# Patient Record
Sex: FEMALE | Race: WHITE | NOT HISPANIC OR LATINO
[De-identification: names, ages, dates, MRNs, and addresses within clinical notes are randomized per-mention and may not be internally consistent; named-entity substitution may affect disease eponyms.]

---

## 2017-03-06 ENCOUNTER — RX RENEWAL (OUTPATIENT)
Age: 62
End: 2017-03-06

## 2018-08-07 ENCOUNTER — OUTPATIENT (OUTPATIENT)
Dept: OUTPATIENT SERVICES | Facility: HOSPITAL | Age: 63
LOS: 1 days | End: 2018-08-07
Payer: COMMERCIAL

## 2018-08-07 ENCOUNTER — APPOINTMENT (OUTPATIENT)
Dept: MAMMOGRAPHY | Facility: HOSPITAL | Age: 63
End: 2018-08-07
Payer: COMMERCIAL

## 2018-08-07 ENCOUNTER — APPOINTMENT (OUTPATIENT)
Dept: ULTRASOUND IMAGING | Facility: HOSPITAL | Age: 63
End: 2018-08-07
Payer: COMMERCIAL

## 2018-08-07 DIAGNOSIS — Z00.8 ENCOUNTER FOR OTHER GENERAL EXAMINATION: ICD-10-CM

## 2018-08-07 PROCEDURE — 77067 SCR MAMMO BI INCL CAD: CPT | Mod: 26

## 2018-08-07 PROCEDURE — 76641 ULTRASOUND BREAST COMPLETE: CPT

## 2018-08-07 PROCEDURE — 76641 ULTRASOUND BREAST COMPLETE: CPT | Mod: 26

## 2018-08-07 PROCEDURE — 77063 BREAST TOMOSYNTHESIS BI: CPT | Mod: 26

## 2018-08-07 PROCEDURE — 77067 SCR MAMMO BI INCL CAD: CPT

## 2018-08-07 PROCEDURE — 77063 BREAST TOMOSYNTHESIS BI: CPT

## 2018-08-09 ENCOUNTER — RESULT REVIEW (OUTPATIENT)
Age: 63
End: 2018-08-09

## 2018-08-13 ENCOUNTER — APPOINTMENT (OUTPATIENT)
Dept: ULTRASOUND IMAGING | Facility: CLINIC | Age: 63
End: 2018-08-13
Payer: COMMERCIAL

## 2018-08-13 ENCOUNTER — OUTPATIENT (OUTPATIENT)
Dept: OUTPATIENT SERVICES | Facility: HOSPITAL | Age: 63
LOS: 1 days | End: 2018-08-13
Payer: COMMERCIAL

## 2018-08-13 ENCOUNTER — RESULT REVIEW (OUTPATIENT)
Age: 63
End: 2018-08-13

## 2018-08-13 DIAGNOSIS — Z00.8 ENCOUNTER FOR OTHER GENERAL EXAMINATION: ICD-10-CM

## 2018-08-13 PROCEDURE — 19083 BX BREAST 1ST LESION US IMAG: CPT | Mod: LT

## 2018-08-13 PROCEDURE — 77065 DX MAMMO INCL CAD UNI: CPT

## 2018-08-13 PROCEDURE — 19083 BX BREAST 1ST LESION US IMAG: CPT

## 2018-08-13 PROCEDURE — A4648: CPT

## 2018-08-13 PROCEDURE — 77065 DX MAMMO INCL CAD UNI: CPT | Mod: 26,LT

## 2019-01-25 ENCOUNTER — TRANSCRIPTION ENCOUNTER (OUTPATIENT)
Age: 64
End: 2019-01-25

## 2019-03-06 ENCOUNTER — APPOINTMENT (OUTPATIENT)
Dept: INTERNAL MEDICINE | Facility: CLINIC | Age: 64
End: 2019-03-06
Payer: COMMERCIAL

## 2019-03-06 VITALS
DIASTOLIC BLOOD PRESSURE: 78 MMHG | TEMPERATURE: 98.1 F | SYSTOLIC BLOOD PRESSURE: 128 MMHG | HEIGHT: 64 IN | HEART RATE: 95 BPM | BODY MASS INDEX: 18.78 KG/M2 | RESPIRATION RATE: 16 BRPM | WEIGHT: 110 LBS | OXYGEN SATURATION: 98 %

## 2019-03-06 DIAGNOSIS — F51.04 PSYCHOPHYSIOLOGIC INSOMNIA: ICD-10-CM

## 2019-03-06 PROCEDURE — 99204 OFFICE O/P NEW MOD 45 MIN: CPT

## 2019-03-06 RX ORDER — MULTIVITAMIN/IRON/FOLIC ACID 18MG-0.4MG
TABLET ORAL
Refills: 0 | Status: ACTIVE | COMMUNITY
Start: 2019-03-06

## 2019-03-06 NOTE — HEALTH RISK ASSESSMENT
[Good] : ~his/her~  mood as  good [Intercurrent Urgi Care visits] : went to urgent care [Patient reported mammogram was abnormal] : Patient reported mammogram was abnormal [Patient reported PAP Smear was normal] : Patient reported PAP Smear was normal [Patient reported bone density results were normal] : Patient reported bone density results were normal [Patient reported colonoscopy was normal] : Patient reported colonoscopy was normal [HIV test declined] : HIV test declined [Hepatitis C test declined] : Hepatitis C test declined [None] : None [FreeTextEntry1] : had pneumonia [] : No [de-identified] : pneumonia [de-identified] : walks regularly 2-3 miles  [de-identified] : fair [Change in mental status noted] : No change in mental status noted [Language] : denies difficulty with language [Behavior] : denies difficulty with behavior [Learning/Retaining New Information] : denies difficulty learning/retaining new information [Handling Complex Tasks] : denies difficulty handling complex tasks [Reasoning] : denies difficulty with reasoning [Spatial Ability and Orientation] : denies difficulty with spatial ability and orientation [MammogramDate] : 08/2018 [MammogramComments] : biopsy, no cancer [PapSmearDate] : 8/2018 [BoneDensityDate] : 2017 [BoneDensityComments] : osteopenia [ColonoscopyDate] : 2012

## 2019-03-06 NOTE — HISTORY OF PRESENT ILLNESS
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\par Dtr Phoebe Parker  @ [employer]\par \par Ade Sandhu

## 2019-03-07 ENCOUNTER — TRANSCRIPTION ENCOUNTER (OUTPATIENT)
Age: 64
End: 2019-03-07

## 2019-03-07 LAB
ALBUMIN SERPL ELPH-MCNC: 4.6 G/DL
ALP BLD-CCNC: 78 U/L
ALT SERPL-CCNC: 21 U/L
ANION GAP SERPL CALC-SCNC: 13 MMOL/L
AST SERPL-CCNC: 23 U/L
BASOPHILS # BLD AUTO: 0.08 K/UL
BASOPHILS NFR BLD AUTO: 1.6 %
BILIRUB SERPL-MCNC: 0.4 MG/DL
BUN SERPL-MCNC: 20 MG/DL
CALCIUM SERPL-MCNC: 9.6 MG/DL
CHLORIDE SERPL-SCNC: 102 MMOL/L
CHOLEST SERPL-MCNC: 182 MG/DL
CHOLEST/HDLC SERPL: 2.1 RATIO
CO2 SERPL-SCNC: 24 MMOL/L
CREAT SERPL-MCNC: 0.72 MG/DL
EOSINOPHIL # BLD AUTO: 0.11 K/UL
EOSINOPHIL NFR BLD AUTO: 2.2 %
GLUCOSE SERPL-MCNC: 105 MG/DL
HBA1C MFR BLD HPLC: 5.9 %
HCT VFR BLD CALC: 40 %
HDLC SERPL-MCNC: 85 MG/DL
HGB BLD-MCNC: 12.9 G/DL
IMM GRANULOCYTES NFR BLD AUTO: 0.4 %
LDLC SERPL CALC-MCNC: 87 MG/DL
LYMPHOCYTES # BLD AUTO: 1.67 K/UL
LYMPHOCYTES NFR BLD AUTO: 33.7 %
MAN DIFF?: NORMAL
MCHC RBC-ENTMCNC: 28.5 PG
MCHC RBC-ENTMCNC: 32.3 GM/DL
MCV RBC AUTO: 88.5 FL
MONOCYTES # BLD AUTO: 0.47 K/UL
MONOCYTES NFR BLD AUTO: 9.5 %
NEUTROPHILS # BLD AUTO: 2.6 K/UL
NEUTROPHILS NFR BLD AUTO: 52.6 %
PLATELET # BLD AUTO: 273 K/UL
POTASSIUM SERPL-SCNC: 4.3 MMOL/L
PROT SERPL-MCNC: 7 G/DL
RBC # BLD: 4.52 M/UL
RBC # FLD: 13.2 %
SODIUM SERPL-SCNC: 139 MMOL/L
TRIGL SERPL-MCNC: 49 MG/DL
TSH SERPL-ACNC: 1.64 UIU/ML
WBC # FLD AUTO: 4.95 K/UL

## 2019-03-08 LAB — 25(OH)D3 SERPL-MCNC: 13.2 NG/ML

## 2019-03-25 ENCOUNTER — TRANSCRIPTION ENCOUNTER (OUTPATIENT)
Age: 64
End: 2019-03-25

## 2019-04-10 ENCOUNTER — CLINICAL ADVICE (OUTPATIENT)
Age: 64
End: 2019-04-10

## 2019-10-06 ENCOUNTER — TRANSCRIPTION ENCOUNTER (OUTPATIENT)
Age: 64
End: 2019-10-06

## 2019-10-06 DIAGNOSIS — J30.9 ALLERGIC RHINITIS, UNSPECIFIED: ICD-10-CM

## 2019-10-28 ENCOUNTER — APPOINTMENT (OUTPATIENT)
Dept: INTERNAL MEDICINE | Facility: CLINIC | Age: 64
End: 2019-10-28
Payer: COMMERCIAL

## 2019-10-28 VITALS
TEMPERATURE: 98.1 F | BODY MASS INDEX: 18.78 KG/M2 | WEIGHT: 110 LBS | RESPIRATION RATE: 16 BRPM | HEIGHT: 64 IN | HEART RATE: 108 BPM | DIASTOLIC BLOOD PRESSURE: 80 MMHG | SYSTOLIC BLOOD PRESSURE: 130 MMHG | OXYGEN SATURATION: 98 %

## 2019-10-28 PROCEDURE — 99213 OFFICE O/P EST LOW 20 MIN: CPT

## 2019-10-28 NOTE — HISTORY OF PRESENT ILLNESS
[Moderate] : moderate [___ Days ago] : [unfilled] days ago [Episodic] : episodic  [Cough] : cough [Congestion] : no congestion [Sore Throat] : no sore throat [Anorexia] : no anorexia [Shortness Of Breath] : no shortness of breath [Headache] : no headache [Fever] : no fever [FreeTextEntry8] : was away at son's wedding and came back was run down \par -took robitussin no relief\par -cough is uncontrollable at times\par -took zpack and mucinex some relief\par \par

## 2019-11-14 ENCOUNTER — TRANSCRIPTION ENCOUNTER (OUTPATIENT)
Age: 64
End: 2019-11-14

## 2019-12-27 ENCOUNTER — OUTPATIENT (OUTPATIENT)
Dept: OUTPATIENT SERVICES | Facility: HOSPITAL | Age: 64
LOS: 1 days | End: 2019-12-27
Payer: COMMERCIAL

## 2019-12-27 DIAGNOSIS — K29.00 ACUTE GASTRITIS WITHOUT BLEEDING: ICD-10-CM

## 2019-12-27 DIAGNOSIS — R11.2 NAUSEA WITH VOMITING, UNSPECIFIED: ICD-10-CM

## 2019-12-27 PROCEDURE — 74220 X-RAY XM ESOPHAGUS 1CNTRST: CPT | Mod: 26

## 2019-12-27 PROCEDURE — 74220 X-RAY XM ESOPHAGUS 1CNTRST: CPT

## 2020-01-31 RX ORDER — AZITHROMYCIN 250 MG/1
250 TABLET, FILM COATED ORAL
Qty: 1 | Refills: 1 | Status: DISCONTINUED | COMMUNITY
Start: 2019-10-24 | End: 2020-01-31

## 2020-02-18 ENCOUNTER — OUTPATIENT (OUTPATIENT)
Dept: OUTPATIENT SERVICES | Facility: HOSPITAL | Age: 65
LOS: 1 days | End: 2020-02-18
Payer: COMMERCIAL

## 2020-02-18 DIAGNOSIS — R05 COUGH: ICD-10-CM

## 2020-02-18 LAB — GLUCOSE BLDC GLUCOMTR-MCNC: 125 MG/DL — HIGH (ref 70–99)

## 2020-02-18 PROCEDURE — A9541: CPT

## 2020-02-18 PROCEDURE — 78264 GASTRIC EMPTYING IMG STUDY: CPT

## 2020-02-18 PROCEDURE — 82962 GLUCOSE BLOOD TEST: CPT

## 2020-02-18 PROCEDURE — 78264 GASTRIC EMPTYING IMG STUDY: CPT | Mod: 26,GC

## 2020-02-19 ENCOUNTER — TRANSCRIPTION ENCOUNTER (OUTPATIENT)
Age: 65
End: 2020-02-19

## 2020-03-05 ENCOUNTER — OUTPATIENT (OUTPATIENT)
Dept: OUTPATIENT SERVICES | Facility: HOSPITAL | Age: 65
LOS: 1 days | End: 2020-03-05
Payer: COMMERCIAL

## 2020-03-05 DIAGNOSIS — R05 COUGH: ICD-10-CM

## 2020-03-05 PROCEDURE — 91010 ESOPHAGUS MOTILITY STUDY: CPT

## 2020-03-05 PROCEDURE — C1889: CPT

## 2020-04-16 ENCOUNTER — APPOINTMENT (OUTPATIENT)
Dept: GASTROENTEROLOGY | Facility: CLINIC | Age: 65
End: 2020-04-16

## 2020-05-11 ENCOUNTER — APPOINTMENT (OUTPATIENT)
Dept: INTERNAL MEDICINE | Facility: CLINIC | Age: 65
End: 2020-05-11
Payer: COMMERCIAL

## 2020-05-11 DIAGNOSIS — K44.9 DIAPHRAGMATIC HERNIA W/OUT OBSTRUCTION OR GANGRENE: ICD-10-CM

## 2020-05-11 DIAGNOSIS — R68.89 OTHER GENERAL SYMPTOMS AND SIGNS: ICD-10-CM

## 2020-05-11 DIAGNOSIS — K21.9 GASTRO-ESOPHAGEAL REFLUX DISEASE W/OUT ESOPHAGITIS: ICD-10-CM

## 2020-05-11 PROCEDURE — 99443: CPT

## 2020-05-12 ENCOUNTER — LABORATORY RESULT (OUTPATIENT)
Age: 65
End: 2020-05-12

## 2020-08-03 ENCOUNTER — APPOINTMENT (OUTPATIENT)
Dept: INTERNAL MEDICINE | Facility: CLINIC | Age: 65
End: 2020-08-03
Payer: COMMERCIAL

## 2020-08-03 PROCEDURE — 99441: CPT

## 2020-08-06 LAB — SARS-COV-2 N GENE NPH QL NAA+PROBE: NOT DETECTED

## 2020-08-13 ENCOUNTER — APPOINTMENT (OUTPATIENT)
Dept: INTERNAL MEDICINE | Facility: CLINIC | Age: 65
End: 2020-08-13
Payer: COMMERCIAL

## 2020-08-13 ENCOUNTER — OUTPATIENT (OUTPATIENT)
Dept: OUTPATIENT SERVICES | Facility: HOSPITAL | Age: 65
LOS: 1 days | End: 2020-08-13
Payer: COMMERCIAL

## 2020-08-13 ENCOUNTER — APPOINTMENT (OUTPATIENT)
Dept: RADIOLOGY | Facility: HOSPITAL | Age: 65
End: 2020-08-13
Payer: COMMERCIAL

## 2020-08-13 VITALS
HEIGHT: 64 IN | SYSTOLIC BLOOD PRESSURE: 130 MMHG | TEMPERATURE: 97.6 F | DIASTOLIC BLOOD PRESSURE: 80 MMHG | HEART RATE: 110 BPM | OXYGEN SATURATION: 98 % | RESPIRATION RATE: 17 BRPM | BODY MASS INDEX: 18.78 KG/M2 | WEIGHT: 110 LBS

## 2020-08-13 DIAGNOSIS — Z00.8 ENCOUNTER FOR OTHER GENERAL EXAMINATION: ICD-10-CM

## 2020-08-13 DIAGNOSIS — Z87.09 PERSONAL HISTORY OF OTHER DISEASES OF THE RESPIRATORY SYSTEM: ICD-10-CM

## 2020-08-13 PROCEDURE — 99213 OFFICE O/P EST LOW 20 MIN: CPT

## 2020-08-13 PROCEDURE — 71046 X-RAY EXAM CHEST 2 VIEWS: CPT

## 2020-08-13 PROCEDURE — 71046 X-RAY EXAM CHEST 2 VIEWS: CPT | Mod: 26

## 2020-08-13 NOTE — PHYSICAL EXAM
[No Acute Distress] : no acute distress [Well Nourished] : well nourished [Well-Appearing] : well-appearing [Well Developed] : well developed [Normal Sclera/Conjunctiva] : normal sclera/conjunctiva [EOMI] : extraocular movements intact [PERRL] : pupils equal round and reactive to light [Normal Outer Ear/Nose] : the outer ears and nose were normal in appearance [Normal Oropharynx] : the oropharynx was normal [No JVD] : no jugular venous distention [No Lymphadenopathy] : no lymphadenopathy [Thyroid Normal, No Nodules] : the thyroid was normal and there were no nodules present [Supple] : supple [No Accessory Muscle Use] : no accessory muscle use [No Respiratory Distress] : no respiratory distress  [Clear to Auscultation] : lungs were clear to auscultation bilaterally [Normal Rate] : normal rate  [Regular Rhythm] : with a regular rhythm [Normal S1, S2] : normal S1 and S2 [No Murmur] : no murmur heard [No Carotid Bruits] : no carotid bruits [No Abdominal Bruit] : a ~M bruit was not heard ~T in the abdomen [No Varicosities] : no varicosities [Pedal Pulses Present] : the pedal pulses are present [No Edema] : there was no peripheral edema [No Palpable Aorta] : no palpable aorta [No Extremity Clubbing/Cyanosis] : no extremity clubbing/cyanosis [Soft] : abdomen soft [Non Tender] : non-tender [Non-distended] : non-distended [No Masses] : no abdominal mass palpated [No HSM] : no HSM [Normal Bowel Sounds] : normal bowel sounds [Normal Anterior Cervical Nodes] : no anterior cervical lymphadenopathy [Normal Posterior Cervical Nodes] : no posterior cervical lymphadenopathy [No CVA Tenderness] : no CVA  tenderness [No Spinal Tenderness] : no spinal tenderness [No Joint Swelling] : no joint swelling [Grossly Normal Strength/Tone] : grossly normal strength/tone [No Rash] : no rash [Coordination Grossly Intact] : coordination grossly intact [No Focal Deficits] : no focal deficits [Normal Affect] : the affect was normal [Normal Gait] : normal gait [Normal Insight/Judgement] : insight and judgment were intact

## 2020-11-04 DIAGNOSIS — Z86.69 PERSONAL HISTORY OF OTHER DISEASES OF THE NERVOUS SYSTEM AND SENSE ORGANS: ICD-10-CM

## 2020-11-04 RX ORDER — BENZONATATE 200 MG/1
200 CAPSULE ORAL 3 TIMES DAILY
Qty: 20 | Refills: 1 | Status: DISCONTINUED | COMMUNITY
Start: 2019-10-28 | End: 2020-11-04

## 2020-11-04 RX ORDER — AZITHROMYCIN 250 MG/1
250 TABLET, FILM COATED ORAL
Qty: 1 | Refills: 1 | Status: DISCONTINUED | COMMUNITY
Start: 2020-08-13 | End: 2020-11-04

## 2020-12-23 PROBLEM — Z86.69 HISTORY OF CONJUNCTIVITIS: Status: RESOLVED | Noted: 2020-11-04 | Resolved: 2020-12-23

## 2020-12-23 PROBLEM — Z87.09 HISTORY OF ACUTE BRONCHITIS: Status: RESOLVED | Noted: 2019-10-28 | Resolved: 2020-12-23

## 2021-01-26 ENCOUNTER — APPOINTMENT (OUTPATIENT)
Dept: INTERNAL MEDICINE | Facility: CLINIC | Age: 66
End: 2021-01-26
Payer: MEDICARE

## 2021-01-26 ENCOUNTER — NON-APPOINTMENT (OUTPATIENT)
Age: 66
End: 2021-01-26

## 2021-01-26 DIAGNOSIS — H25.093 OTHER AGE-RELATED INCIPIENT CATARACT, BILATERAL: ICD-10-CM

## 2021-01-26 PROCEDURE — 99072 ADDL SUPL MATRL&STAF TM PHE: CPT

## 2021-01-26 PROCEDURE — 99214 OFFICE O/P EST MOD 30 MIN: CPT

## 2021-01-26 RX ORDER — BENZONATATE 100 MG/1
100 CAPSULE ORAL 3 TIMES DAILY
Qty: 90 | Refills: 5 | Status: ACTIVE | COMMUNITY
Start: 2021-01-26 | End: 1900-01-01

## 2021-01-26 NOTE — ASSESSMENT
[0] : 0 , RCRI Class: I, Risk of Post-Op Cardiac Complications: 3.9%, 95% CI for Risk Estimate: 2.8% - 5.4% [Patient Optimized for Surgery] : Patient optimized for surgery [No Further Testing Recommended] : no further testing recommended

## 2021-01-28 VITALS
RESPIRATION RATE: 16 BRPM | SYSTOLIC BLOOD PRESSURE: 120 MMHG | TEMPERATURE: 98.7 F | DIASTOLIC BLOOD PRESSURE: 70 MMHG | BODY MASS INDEX: 18.78 KG/M2 | OXYGEN SATURATION: 98 % | HEART RATE: 80 BPM | HEIGHT: 64 IN | WEIGHT: 110 LBS

## 2021-01-28 NOTE — HISTORY OF PRESENT ILLNESS
[No Pertinent Cardiac History] : no history of aortic stenosis, atrial fibrillation, coronary artery disease, recent myocardial infarction, or implantable device/pacemaker [No Pertinent Pulmonary History] : no history of asthma, COPD, sleep apnea, or smoking [No Adverse Anesthesia Reaction] : no adverse anesthesia reaction in self or family member [(Patient denies any chest pain, claudication, dyspnea on exertion, orthopnea, palpitations or syncope)] : Patient denies any chest pain, claudication, dyspnea on exertion, orthopnea, palpitations or syncope [Aortic Stenosis] : no aortic stenosis [Atrial Fibrillation] : no atrial fibrillation [Coronary Artery Disease] : no coronary artery disease [Recent Myocardial Infarction] : no recent myocardial infarction [Implantable Device/Pacemaker] : no implantable device/pacemaker [Asthma] : no asthma [COPD] : no COPD [Sleep Apnea] : no sleep apnea [Smoker] : not a smoker [Family Member] : no family member with adverse anesthesia reaction/sudden death [Self] : no previous adverse anesthesia reaction [Chronic Anticoagulation] : no chronic anticoagulation [Chronic Kidney Disease] : no chronic kidney disease [Diabetes] : no diabetes [FreeTextEntry1] : Left Cataract extraction [FreeTextEntry2] : 2/1/2021 [FreeTextEntry3] : Dr Arredondo

## 2021-05-04 ENCOUNTER — APPOINTMENT (OUTPATIENT)
Dept: SURGERY | Facility: CLINIC | Age: 66
End: 2021-05-04
Payer: MEDICARE

## 2021-05-04 VITALS
BODY MASS INDEX: 18.78 KG/M2 | HEIGHT: 64 IN | SYSTOLIC BLOOD PRESSURE: 171 MMHG | OXYGEN SATURATION: 96 % | DIASTOLIC BLOOD PRESSURE: 107 MMHG | HEART RATE: 119 BPM | WEIGHT: 110 LBS

## 2021-05-04 PROCEDURE — 99204 OFFICE O/P NEW MOD 45 MIN: CPT

## 2021-05-10 ENCOUNTER — OUTPATIENT (OUTPATIENT)
Dept: OUTPATIENT SERVICES | Facility: HOSPITAL | Age: 66
LOS: 1 days | End: 2021-05-10
Payer: MEDICARE

## 2021-05-10 VITALS
SYSTOLIC BLOOD PRESSURE: 147 MMHG | TEMPERATURE: 98 F | DIASTOLIC BLOOD PRESSURE: 66 MMHG | OXYGEN SATURATION: 98 % | WEIGHT: 111.33 LBS | RESPIRATION RATE: 16 BRPM | HEART RATE: 82 BPM | HEIGHT: 64 IN

## 2021-05-10 DIAGNOSIS — Z98.890 OTHER SPECIFIED POSTPROCEDURAL STATES: Chronic | ICD-10-CM

## 2021-05-10 DIAGNOSIS — K21.9 GASTRO-ESOPHAGEAL REFLUX DISEASE WITHOUT ESOPHAGITIS: ICD-10-CM

## 2021-05-10 DIAGNOSIS — Z98.49 CATARACT EXTRACTION STATUS, UNSPECIFIED EYE: Chronic | ICD-10-CM

## 2021-05-10 DIAGNOSIS — Q40.1 CONGENITAL HIATUS HERNIA: ICD-10-CM

## 2021-05-10 DIAGNOSIS — Z01.818 ENCOUNTER FOR OTHER PREPROCEDURAL EXAMINATION: ICD-10-CM

## 2021-05-10 LAB
ALBUMIN SERPL ELPH-MCNC: 4.1 G/DL — SIGNIFICANT CHANGE UP (ref 3.3–5)
ALP SERPL-CCNC: 84 U/L — SIGNIFICANT CHANGE UP (ref 40–120)
ALT FLD-CCNC: 22 U/L — SIGNIFICANT CHANGE UP (ref 12–78)
ANION GAP SERPL CALC-SCNC: 7 MMOL/L — SIGNIFICANT CHANGE UP (ref 5–17)
AST SERPL-CCNC: 20 U/L — SIGNIFICANT CHANGE UP (ref 15–37)
BILIRUB SERPL-MCNC: 0.2 MG/DL — SIGNIFICANT CHANGE UP (ref 0.2–1.2)
BUN SERPL-MCNC: 14 MG/DL — SIGNIFICANT CHANGE UP (ref 7–23)
CALCIUM SERPL-MCNC: 8.8 MG/DL — SIGNIFICANT CHANGE UP (ref 8.5–10.1)
CHLORIDE SERPL-SCNC: 106 MMOL/L — SIGNIFICANT CHANGE UP (ref 96–108)
CO2 SERPL-SCNC: 27 MMOL/L — SIGNIFICANT CHANGE UP (ref 22–31)
CREAT SERPL-MCNC: 0.7 MG/DL — SIGNIFICANT CHANGE UP (ref 0.5–1.3)
GLUCOSE SERPL-MCNC: 97 MG/DL — SIGNIFICANT CHANGE UP (ref 70–99)
HCT VFR BLD CALC: 39.6 % — SIGNIFICANT CHANGE UP (ref 34.5–45)
HGB BLD-MCNC: 13.3 G/DL — SIGNIFICANT CHANGE UP (ref 11.5–15.5)
MCHC RBC-ENTMCNC: 29.1 PG — SIGNIFICANT CHANGE UP (ref 27–34)
MCHC RBC-ENTMCNC: 33.6 GM/DL — SIGNIFICANT CHANGE UP (ref 32–36)
MCV RBC AUTO: 86.7 FL — SIGNIFICANT CHANGE UP (ref 80–100)
NRBC # BLD: 0 /100 WBCS — SIGNIFICANT CHANGE UP (ref 0–0)
PLATELET # BLD AUTO: 295 K/UL — SIGNIFICANT CHANGE UP (ref 150–400)
POTASSIUM SERPL-MCNC: 3.7 MMOL/L — SIGNIFICANT CHANGE UP (ref 3.5–5.3)
POTASSIUM SERPL-SCNC: 3.7 MMOL/L — SIGNIFICANT CHANGE UP (ref 3.5–5.3)
PROT SERPL-MCNC: 7.8 G/DL — SIGNIFICANT CHANGE UP (ref 6–8.3)
RBC # BLD: 4.57 M/UL — SIGNIFICANT CHANGE UP (ref 3.8–5.2)
RBC # FLD: 12.9 % — SIGNIFICANT CHANGE UP (ref 10.3–14.5)
SODIUM SERPL-SCNC: 140 MMOL/L — SIGNIFICANT CHANGE UP (ref 135–145)
WBC # BLD: 7.94 K/UL — SIGNIFICANT CHANGE UP (ref 3.8–10.5)
WBC # FLD AUTO: 7.94 K/UL — SIGNIFICANT CHANGE UP (ref 3.8–10.5)

## 2021-05-10 PROCEDURE — 86850 RBC ANTIBODY SCREEN: CPT

## 2021-05-10 PROCEDURE — 85027 COMPLETE CBC AUTOMATED: CPT

## 2021-05-10 PROCEDURE — 80053 COMPREHEN METABOLIC PANEL: CPT

## 2021-05-10 PROCEDURE — 93005 ELECTROCARDIOGRAM TRACING: CPT

## 2021-05-10 PROCEDURE — 86901 BLOOD TYPING SEROLOGIC RH(D): CPT

## 2021-05-10 PROCEDURE — 86900 BLOOD TYPING SEROLOGIC ABO: CPT

## 2021-05-10 PROCEDURE — 93010 ELECTROCARDIOGRAM REPORT: CPT

## 2021-05-10 PROCEDURE — G0463: CPT

## 2021-05-10 PROCEDURE — 36415 COLL VENOUS BLD VENIPUNCTURE: CPT

## 2021-05-10 NOTE — H&P PST ADULT - ASSESSMENT
66 y/o female, scheduled for laparoscopic repair of hiatal hernia with fundoplication. pre op testing today.   66 y/o female, scheduled for laparoscopic repair of hiatal hernia with fundoplication. pre op testing today.  Pt. seen and examined. Chart reviewed. Discussed periop issues

## 2021-05-10 NOTE — H&P PST ADULT - HISTORY OF PRESENT ILLNESS
64 y/o female with c/o of acid reflux for few yrs. Had many different tests finally diagnosed with hiatal hernia. Seen by Dr New ,scheduled for laparoscopic repair of hiatal hernia with fundoplication. pre op testing today.

## 2021-05-10 NOTE — H&P PST ADULT - NSANTHOSAYNRD_GEN_A_CORE
No. ABHAY screening performed.  STOP BANG Legend: 0-2 = LOW Risk; 3-4 = INTERMEDIATE Risk; 5-8 = HIGH Risk

## 2021-05-10 NOTE — H&P PST ADULT - NSICDXPROBLEM_GEN_ALL_CORE_FT
PROBLEM DIAGNOSES  Problem: Hiatal hernia with GERD  Assessment and Plan: scheduled for laparoscopic repair of hiatal hernia with fundoplication. pre op testing today.  Medical eval advised.  Pre op instructions:  Hold OTC supplements. Medications reviewed for the week and morning of surgery.  NPO after 11pm to the morning of surgery.  Shower 2 days before and the morning of surgery with antibacterial soap as instructed.  Covid testing information given.  Patient verbalized understanding.

## 2021-05-11 ENCOUNTER — APPOINTMENT (OUTPATIENT)
Dept: INTERNAL MEDICINE | Facility: CLINIC | Age: 66
End: 2021-05-11
Payer: MEDICARE

## 2021-05-11 VITALS
RESPIRATION RATE: 16 BRPM | SYSTOLIC BLOOD PRESSURE: 142 MMHG | BODY MASS INDEX: 18.95 KG/M2 | DIASTOLIC BLOOD PRESSURE: 90 MMHG | TEMPERATURE: 98.4 F | WEIGHT: 111 LBS | HEART RATE: 109 BPM | HEIGHT: 64 IN | OXYGEN SATURATION: 99 %

## 2021-05-11 DIAGNOSIS — Z23 ENCOUNTER FOR IMMUNIZATION: ICD-10-CM

## 2021-05-11 DIAGNOSIS — Z00.00 ENCOUNTER FOR GENERAL ADULT MEDICAL EXAMINATION W/OUT ABNORMAL FINDINGS: ICD-10-CM

## 2021-05-11 PROCEDURE — 99213 OFFICE O/P EST LOW 20 MIN: CPT

## 2021-05-12 PROBLEM — K21.9 GASTRO-ESOPHAGEAL REFLUX DISEASE WITHOUT ESOPHAGITIS: Chronic | Status: ACTIVE | Noted: 2021-05-10

## 2021-05-14 ENCOUNTER — OUTPATIENT (OUTPATIENT)
Dept: OUTPATIENT SERVICES | Facility: HOSPITAL | Age: 66
LOS: 1 days | End: 2021-05-14
Payer: MEDICARE

## 2021-05-14 DIAGNOSIS — Z20.828 CONTACT WITH AND (SUSPECTED) EXPOSURE TO OTHER VIRAL COMMUNICABLE DISEASES: ICD-10-CM

## 2021-05-14 DIAGNOSIS — Z98.890 OTHER SPECIFIED POSTPROCEDURAL STATES: Chronic | ICD-10-CM

## 2021-05-14 DIAGNOSIS — Z98.49 CATARACT EXTRACTION STATUS, UNSPECIFIED EYE: Chronic | ICD-10-CM

## 2021-05-14 LAB — SARS-COV-2 RNA SPEC QL NAA+PROBE: SIGNIFICANT CHANGE UP

## 2021-05-14 PROCEDURE — U0005: CPT

## 2021-05-14 PROCEDURE — U0003: CPT

## 2021-05-14 NOTE — RESULTS/DATA
[] : results reviewed [de-identified] : Normal poor R wave progression very similar to January 2021

## 2021-05-14 NOTE — ASSESSMENT
[High Risk Surgery - Intraperitoneal, Intrathoracic or Supringuinal Vascular Procedures] : High Risk Surgery - Intraperitoneal, Intrathoracic or Supringuinal Vascular Procedures - No (0) [Ischemic Heart Disease] : Ischemic Heart Disease - No (0) [Congestive Heart Failure] : Congestive Heart Failure - No (0) [Prior Cerebrovascular Accident or TIA] : Prior Cerebrovascular Accident or TIA - No (0) [Creatinine >= 2mg/dL (1 Point)] : Creatinine >= 2mg/dL - No (0) [Insulin-dependent Diabetic (1 Point)] : Insulin-dependent Diabetic - No (0) [0] : 0 , RCRI Class: I, Risk of Post-Op Cardiac Complications: 3.9%, 95% CI for Risk Estimate: 2.8% - 5.4% [Patient Optimized for Surgery] : Patient optimized for surgery [No Further Testing Recommended] : no further testing recommended [FreeTextEntry4] : EKG NSR NON SPECIFIC FINDING OF POOR R WAVE PROGRESSION - SIMILAR TO PREVIOUS EKG DONE IN MY OFFICE 1/21/2021\par - computer read "cannot rule out anterior MI" but there is no clinical evidence of ischemia, prior or current MI or unstable angina\par - this EKG finding is NOT A CONTRAINDICATION TO PROCEED WITH SURGERY

## 2021-05-14 NOTE — ASU PATIENT PROFILE, ADULT - PROVIDER NOTIFICATION
Problem: Falls - Risk of:  Goal: Will remain free from falls  Description: Will remain free from falls  Outcome: Met This Shift  Goal: Absence of physical injury  Description: Absence of physical injury  Outcome: Met This Shift Declines

## 2021-05-16 ENCOUNTER — TRANSCRIPTION ENCOUNTER (OUTPATIENT)
Age: 66
End: 2021-05-16

## 2021-05-17 ENCOUNTER — INPATIENT (INPATIENT)
Facility: HOSPITAL | Age: 66
LOS: 0 days | Discharge: HOME | DRG: 328 | End: 2021-05-18
Attending: SPECIALIST | Admitting: SPECIALIST
Payer: COMMERCIAL

## 2021-05-17 ENCOUNTER — APPOINTMENT (OUTPATIENT)
Dept: SURGERY | Facility: HOSPITAL | Age: 66
End: 2021-05-17

## 2021-05-17 VITALS
DIASTOLIC BLOOD PRESSURE: 87 MMHG | WEIGHT: 111.33 LBS | OXYGEN SATURATION: 99 % | TEMPERATURE: 98 F | RESPIRATION RATE: 16 BRPM | SYSTOLIC BLOOD PRESSURE: 133 MMHG | HEIGHT: 64 IN | HEART RATE: 82 BPM

## 2021-05-17 DIAGNOSIS — Q40.1 CONGENITAL HIATUS HERNIA: ICD-10-CM

## 2021-05-17 DIAGNOSIS — Z98.890 OTHER SPECIFIED POSTPROCEDURAL STATES: Chronic | ICD-10-CM

## 2021-05-17 DIAGNOSIS — Z98.49 CATARACT EXTRACTION STATUS, UNSPECIFIED EYE: Chronic | ICD-10-CM

## 2021-05-17 RX ORDER — OXYCODONE HYDROCHLORIDE 5 MG/1
10 TABLET ORAL EVERY 6 HOURS
Refills: 0 | Status: DISCONTINUED | OUTPATIENT
Start: 2021-05-17 | End: 2021-05-18

## 2021-05-17 RX ORDER — OXYCODONE HYDROCHLORIDE 5 MG/1
5 TABLET ORAL EVERY 4 HOURS
Refills: 0 | Status: DISCONTINUED | OUTPATIENT
Start: 2021-05-17 | End: 2021-05-18

## 2021-05-17 RX ORDER — ONDANSETRON 8 MG/1
4 TABLET, FILM COATED ORAL ONCE
Refills: 0 | Status: DISCONTINUED | OUTPATIENT
Start: 2021-05-17 | End: 2021-05-17

## 2021-05-17 RX ORDER — SODIUM CHLORIDE 9 MG/ML
1000 INJECTION, SOLUTION INTRAVENOUS
Refills: 0 | Status: DISCONTINUED | OUTPATIENT
Start: 2021-05-17 | End: 2021-05-17

## 2021-05-17 RX ORDER — HYDROMORPHONE HYDROCHLORIDE 2 MG/ML
1 INJECTION INTRAMUSCULAR; INTRAVENOUS; SUBCUTANEOUS
Refills: 0 | Status: DISCONTINUED | OUTPATIENT
Start: 2021-05-17 | End: 2021-05-17

## 2021-05-17 RX ORDER — SODIUM CHLORIDE 9 MG/ML
1000 INJECTION, SOLUTION INTRAVENOUS
Refills: 0 | Status: DISCONTINUED | OUTPATIENT
Start: 2021-05-17 | End: 2021-05-18

## 2021-05-17 RX ORDER — ONDANSETRON 8 MG/1
4 TABLET, FILM COATED ORAL EVERY 6 HOURS
Refills: 0 | Status: DISCONTINUED | OUTPATIENT
Start: 2021-05-17 | End: 2021-05-18

## 2021-05-17 RX ORDER — HEPARIN SODIUM 5000 [USP'U]/ML
5000 INJECTION INTRAVENOUS; SUBCUTANEOUS EVERY 8 HOURS
Refills: 0 | Status: DISCONTINUED | OUTPATIENT
Start: 2021-05-18 | End: 2021-05-18

## 2021-05-17 RX ORDER — ACETAMINOPHEN 500 MG
1000 TABLET ORAL ONCE
Refills: 0 | Status: COMPLETED | OUTPATIENT
Start: 2021-05-17 | End: 2021-05-17

## 2021-05-17 RX ORDER — SUCRALFATE 1 G
1 TABLET ORAL
Refills: 0 | Status: DISCONTINUED | OUTPATIENT
Start: 2021-05-17 | End: 2021-05-18

## 2021-05-17 RX ORDER — ACETAMINOPHEN 500 MG
1000 TABLET ORAL ONCE
Refills: 0 | Status: COMPLETED | OUTPATIENT
Start: 2021-05-18 | End: 2021-05-18

## 2021-05-17 RX ORDER — HYDROMORPHONE HYDROCHLORIDE 2 MG/ML
0.5 INJECTION INTRAMUSCULAR; INTRAVENOUS; SUBCUTANEOUS
Refills: 0 | Status: DISCONTINUED | OUTPATIENT
Start: 2021-05-17 | End: 2021-05-17

## 2021-05-17 RX ORDER — PANTOPRAZOLE SODIUM 20 MG/1
40 TABLET, DELAYED RELEASE ORAL
Refills: 0 | Status: DISCONTINUED | OUTPATIENT
Start: 2021-05-17 | End: 2021-05-18

## 2021-05-17 RX ADMIN — Medication 1 GRAM(S): at 16:37

## 2021-05-17 RX ADMIN — SODIUM CHLORIDE 75 MILLILITER(S): 9 INJECTION, SOLUTION INTRAVENOUS at 09:33

## 2021-05-17 RX ADMIN — Medication 400 MILLIGRAM(S): at 16:37

## 2021-05-17 RX ADMIN — SODIUM CHLORIDE 75 MILLILITER(S): 9 INJECTION, SOLUTION INTRAVENOUS at 11:40

## 2021-05-17 RX ADMIN — Medication 1000 MILLIGRAM(S): at 17:01

## 2021-05-17 RX ADMIN — SODIUM CHLORIDE 75 MILLILITER(S): 9 INJECTION, SOLUTION INTRAVENOUS at 06:52

## 2021-05-17 NOTE — PROGRESS NOTE ADULT - SUBJECTIVE AND OBJECTIVE BOX
MYLES OROZCO  [id number] 65y    GENERAL SURGERY POST OP CHECK NOTE  / DR. JIMENEZ    NO N/V, COUGH, DYSPHAGIA, ODYNOPHAGIA, BELCHING, HICCUPS   TOLERATING CLEAR LIQUID DIET     MEDICATIONS  (STANDING):  lactated ringers. 1000 milliLiter(s) (75 mL/Hr) IV Continuous <Continuous>  pantoprazole    Tablet 40 milliGRAM(s) Oral before breakfast  sucralfate 1 Gram(s) Oral four times a day    MEDICATIONS  (PRN):  benzonatate 100 milliGRAM(s) Oral every 8 hours PRN Cough  ondansetron Injectable 4 milliGRAM(s) IV Push every 6 hours PRN Nausea  oxyCODONE    IR 5 milliGRAM(s) Oral every 4 hours PRN Moderate Pain (4 - 6)  oxyCODONE    IR 10 milliGRAM(s) Oral every 6 hours PRN Severe Pain (7 - 10)    Vital Signs Last 24 Hrs  T(C): 36.4 (17 May 2021 13:32), Max: 36.8 (17 May 2021 06:22)  T(F): 97.5 (17 May 2021 13:32), Max: 98.2 (17 May 2021 06:22)  HR: 87 (17 May 2021 13:32) (71 - 87)  BP: 133/86 (17 May 2021 13:32) (126/87 - 143/93)  RR: 18 (17 May 2021 13:32) (14 - 19)  SpO2: 98% (17 May 2021 13:32) (96% - 100%)    05-17-21 @ 07:01  -  05-17-21 @ 17:59  --------------------------------------------------------  IN: 1215 mL / OUT: 150 mL / NET: 1065 mL      LUNGS: CLEAR TO AUSCULTATION , NO W/R/R  ABDOMEN: ALL TROCAR SITES ARE DRY AND INTACT, + BS, SOFT, NON DISTENDED, NON TENDER TO PALPATION    EXTREMITY: NO EDEMA, NO CALF TENDERNESS                              ASSESSMENT &  PLAN: S/P LAPAROSCOPIC FUNDOPLICATION    CLEAR LIQUID DIET, WILL ADVANCE IN AM AFTER EVALUATION   SMALL FREQUENT MELAS, IN A SITTING POSITION    HOB 30-45 DEGREES   OOB, AMBULATE  POST NISSEN DIET BROCHURE GIVEN   D/C PLAN

## 2021-05-17 NOTE — BRIEF OPERATIVE NOTE - NSICDXBRIEFPROCEDURE_GEN_ALL_CORE_FT
PROCEDURES:  Laparoscopic Nissen fundoplication for hiatal hernia 17-May-2021 09:27:28  Hermes Kramer

## 2021-05-17 NOTE — PATIENT PROFILE ADULT - DO YOU LACK THE NECESSARY SUPPORT TO HELP YOU COPE WITH LIFE CHALLENGES?
yes O-T Advancement Flap Text: The defect edges were debeveled with a #15 scalpel blade.  Given the location of the defect, shape of the defect and the proximity to free margins an O-T advancement flap was deemed most appropriate.  Using a sterile surgical marker, an appropriate advancement flap was drawn incorporating the defect and placing the expected incisions within the relaxed skin tension lines where possible.    The area thus outlined was incised deep to adipose tissue with a #15 scalpel blade.  The skin margins were undermined to an appropriate distance in all directions utilizing iris scissors.

## 2021-05-18 ENCOUNTER — TRANSCRIPTION ENCOUNTER (OUTPATIENT)
Age: 66
End: 2021-05-18

## 2021-05-18 VITALS
TEMPERATURE: 98 F | OXYGEN SATURATION: 95 % | RESPIRATION RATE: 16 BRPM | DIASTOLIC BLOOD PRESSURE: 81 MMHG | SYSTOLIC BLOOD PRESSURE: 139 MMHG | HEART RATE: 92 BPM

## 2021-05-18 LAB
COVID-19 SPIKE DOMAIN AB INTERP: POSITIVE
COVID-19 SPIKE DOMAIN ANTIBODY RESULT: >250 U/ML — HIGH
SARS-COV-2 IGG+IGM SERPL QL IA: >250 U/ML — HIGH
SARS-COV-2 IGG+IGM SERPL QL IA: POSITIVE

## 2021-05-18 PROCEDURE — C1889: CPT

## 2021-05-18 PROCEDURE — 36415 COLL VENOUS BLD VENIPUNCTURE: CPT

## 2021-05-18 PROCEDURE — 43281 LAP PARAESOPHAG HERN REPAIR: CPT

## 2021-05-18 PROCEDURE — 86769 SARS-COV-2 COVID-19 ANTIBODY: CPT

## 2021-05-18 RX ORDER — SUCRALFATE 1 G
1 TABLET ORAL
Qty: 0 | Refills: 0 | DISCHARGE

## 2021-05-18 RX ORDER — DEXLANSOPRAZOLE 30 MG/1
1 CAPSULE, DELAYED RELEASE ORAL
Qty: 0 | Refills: 0 | DISCHARGE

## 2021-05-18 RX ORDER — OXYCODONE HYDROCHLORIDE 5 MG/1
1 TABLET ORAL
Qty: 5 | Refills: 0
Start: 2021-05-18

## 2021-05-18 RX ORDER — ONDANSETRON 8 MG/1
1 TABLET, FILM COATED ORAL
Qty: 20 | Refills: 0
Start: 2021-05-18

## 2021-05-18 RX ADMIN — ONDANSETRON 4 MILLIGRAM(S): 8 TABLET, FILM COATED ORAL at 07:58

## 2021-05-18 RX ADMIN — PANTOPRAZOLE SODIUM 40 MILLIGRAM(S): 20 TABLET, DELAYED RELEASE ORAL at 05:35

## 2021-05-18 RX ADMIN — Medication 1 GRAM(S): at 11:17

## 2021-05-18 RX ADMIN — Medication 1 GRAM(S): at 00:37

## 2021-05-18 RX ADMIN — HEPARIN SODIUM 5000 UNIT(S): 5000 INJECTION INTRAVENOUS; SUBCUTANEOUS at 05:35

## 2021-05-18 RX ADMIN — Medication 1 GRAM(S): at 05:35

## 2021-05-18 RX ADMIN — HEPARIN SODIUM 5000 UNIT(S): 5000 INJECTION INTRAVENOUS; SUBCUTANEOUS at 13:01

## 2021-05-18 RX ADMIN — ONDANSETRON 4 MILLIGRAM(S): 8 TABLET, FILM COATED ORAL at 14:13

## 2021-05-18 RX ADMIN — Medication 1000 MILLIGRAM(S): at 01:28

## 2021-05-18 RX ADMIN — Medication 400 MILLIGRAM(S): at 00:37

## 2021-05-18 RX ADMIN — SODIUM CHLORIDE 75 MILLILITER(S): 9 INJECTION, SOLUTION INTRAVENOUS at 12:59

## 2021-05-18 NOTE — DISCHARGE NOTE PROVIDER - CARE PROVIDER_API CALL
Ian New)  Surgery  [address] [place] [address]  Phone: [phone]  Fax: [phone]  Follow Up Time:

## 2021-05-18 NOTE — DISCHARGE NOTE NURSING/CASE MANAGEMENT/SOCIAL WORK - NSDCPNINST_GEN_ALL_CORE
No heavy lifting. Eat slow and eat sitting up. Persistent nausea, vomiting chest pain, fever call Dr New or go to the nearest emergency room.

## 2021-05-18 NOTE — DISCHARGE NOTE NURSING/CASE MANAGEMENT/SOCIAL WORK - PATIENT PORTAL LINK FT
You can access the FollowMyHealth Patient Portal offered by [facility] by registering at the following website: [email or web address]. By joining [employer]’s FollowMyHealth portal, you will also be able to view your health information using other applications (apps) compatible with our system.

## 2021-05-18 NOTE — DISCHARGE NOTE PROVIDER - NSDCCPTREATMENT_GEN_ALL_CORE_FT
PRINCIPAL PROCEDURE  Procedure: Laparoscopic Nissen fundoplication for hiatal hernia  Findings and Treatment:

## 2021-05-18 NOTE — DISCHARGE NOTE PROVIDER - NSDCMRMEDTOKEN_GEN_ALL_CORE_FT
benzonatate 100 mg oral capsule: as needed  Dexilant 60 mg oral delayed release capsule: 1 cap(s) orally once a day  Multiple Vitamins oral capsule: 1 cap(s) orally once a day  sucralfate 1 g oral tablet: 1 tab(s) orally 4 times a day (before meals and at bedtime)   Aleve 220 mg oral tablet: 1 tab(s) orally every 12 hours as needed for moderate pain  Dexilant 60 mg oral delayed release capsule: 1 cap(s) orally once a day  Multiple Vitamins oral capsule: 1 cap(s) orally once a day  oxyCODONE 5 mg oral capsule: 1 cap(s) orally every 4 hours, As Needed -for severe pain MDD:4   Tylenol 500 mg oral tablet: 2 tab(s) orally every 6 hours as needed for mild pain  Zofran 4 mg oral tablet: 1 tab(s) orally every 6 hours -for nausea MDD:4

## 2021-05-18 NOTE — DISCHARGE NOTE PROVIDER - HOSPITAL COURSE
65y Female with PMHx of GERD, s/p cataract surgery (2/2021), s/p breast biopsy (2020) presented to [facility] for elective laparoscopic Nissen fundoplication following previous diagnosis of hiatal hernia.    Hospital course:  Patient underwent successful laparoscopic Nissen fundoplication without complications, was sent to PACU then to the floor for monitoring. Over then next 24 hours, the patient was given fluids, DVT prophylaxis, and pain control. Diet was advanced appropriately until pt able to tolerate puree diet.     Disposition: Patient to follow-up with Dr. New as outpatient on Tuesday.           65y Female with PMHx of GERD, s/p cataract surgery (2/2021), s/p breast biopsy (2020) presented to [facility] for elective laparoscopic Nissen fundoplication following previous diagnosis of hiatal hernia.    Hospital course:  Patient underwent successful laparoscopic Nissen fundoplication without complications, was sent to PACU then to the floor for monitoring. Over then next 24 hours, the patient was given fluids, DVT prophylaxis, and pain control. Pt also given Zofran PRN for nausea. Diet was advanced appropriately until pt able to tolerate puree diet.     Disposition: Patient to follow-up with Dr. New as outpatient on Tuesday.           65y Female with PMHx of GERD, s/p cataract surgery (2/2021), s/p breast biopsy (2020) presented to [facility] for elective laparoscopic Nissen fundoplication following previous diagnosis of hiatal hernia.    Hospital course:  Patient underwent successful laparoscopic Nissen fundoplication without complications, was sent to PACU then to the floor for monitoring. Over then next 24 hours, the patient was given fluids, DVT prophylaxis, and pain control. Pt also given Zofran as needed for nausea. Diet was advanced appropriately until pt able to tolerate puree diet.     Disposition: Patient to follow-up with Dr. New as outpatient on Tuesday.

## 2021-05-18 NOTE — DISCHARGE NOTE PROVIDER - INSTRUCTIONS
Continue puree diet (custard, jello, milk/cream based, soup, blended vegetables) until appointment with Dr. New  Eat small, frequent meals in a sitting or standing position.   Keep head of bed 30-45 degrees   Continue puree diet as per brochure (custard, jello, milk/cream based, soup, blended vegetables) until appointment with Dr. New.  Avoid caffeine, citrus and carbonate beverages  Eat small, frequent meals in a sitting or standing position.   Keep head of bed 30-45 degrees.   -Continue puree diet as per brochure (custard, jello, milk/cream based, soup, blended vegetables) until appointment with Dr. New.  -Avoid caffeine, citrus and carbonate beverages  Eat small, frequent meals in a sitting or standing position.   Keep head of bed 30-45 degrees.

## 2021-05-18 NOTE — PROGRESS NOTE ADULT - SUBJECTIVE AND OBJECTIVE BOX
The patient was evaluated  65y Female s/p laparoscopic repair hiatal hernia and fundiplication with GA    T(C): 36.9 (05-18-21 @ 05:18), Max: 36.9 (05-18-21 @ 05:18)  HR: 86 (05-18-21 @ 05:18) (77 - 87)  BP: 123/74 (05-18-21 @ 05:18) (119/72 - 138/78)  RR: 17 (05-18-21 @ 05:18) (17 - 19)  SpO2: 98% (05-18-21 @ 05:18) (97% - 100%)  Wt(kg): --    Pt  doing well, no anesthesia complications or complaints noted or reported.       No additional recommendations.

## 2021-05-18 NOTE — DISCHARGE NOTE PROVIDER - NSDCCPCAREPLAN_GEN_ALL_CORE_FT
PRINCIPAL DISCHARGE DIAGNOSIS  Diagnosis: Hiatal hernia  Assessment and Plan of Treatment:       
1 person assist/verbal cues

## 2021-05-18 NOTE — PROGRESS NOTE ADULT - ASSESSMENT
Pt. seen and examined. Sitting in a chair and eating  custard and jello. Has some chest discomfort, but tolerating food well  Would continue with puree type of food and diet will be advanced after pt . office visit next week.

## 2021-05-18 NOTE — DISCHARGE NOTE PROVIDER - NSDCFUADDINST_GEN_ALL_CORE_FT
Keep glue clean, dry and intact x 24 hrs. Apply water proof ice pack 20 mins on, 20 mins off to help decrease pain and swelling. You may begin showering as usual but Do NOT pick glue. Do not scrub or soak incision site. Pat dry. NO tub baths, NO swimming pools. No hot tubs.  Do not lift anything over 10 lbs.  Take frequent walks.   Keep glue clean, dry and intact x 24 hrs. Apply water proof ice pack 20 mins on, 20 mins off to help decrease pain and swelling. You may begin showering as usual but Do NOT pick glue. Do not scrub or soak incision site. Pat dry. NO tub baths, NO swimming pools. No hot tubs.  Do not lift anything over 10 lbs.  Take frequent walks.  Take Zofran as prescribed for nausea Keep glue clean, dry and intact x 24 hrs. Apply water proof ice pack 20 mins on, 20 mins off to help decrease pain and swelling. You may begin showering as usual but Do NOT pick glue. Do not scrub or soak incision site. Pat dry. NO tub baths, NO swimming pools. No hot tubs.  Do not lift anything over 10 lbs.  Take frequent walks.  Take Zofran as prescribed for nausea  Take Aleve and Tylenol for mild pain. Take Percocet as prescribed as needed for severe pain ONLY.  Keep glue clean, dry and intact x 24 hrs. Apply water proof ice pack 20 mins on, 20 mins off to help decrease pain and swelling. You may begin showering as usual but Do NOT pick glue. Do not scrub or soak incision site. Pat dry. NO tub baths, NO swimming pools. No hot tubs.  Do not lift anything over 10 lbs.  Take frequent walks.  Take Zofran as needed for nausea  Take Tylenol as instructed for for mild pain, Alevel as needed for moderate pain and Take Percocet as prescribed as needed for severe pain ONLY. Take stool softner (Colace or Senna) as needed for constipation.

## 2021-05-25 ENCOUNTER — APPOINTMENT (OUTPATIENT)
Dept: SURGERY | Facility: CLINIC | Age: 66
End: 2021-05-25
Payer: MEDICARE

## 2021-05-25 VITALS
OXYGEN SATURATION: 10 % | SYSTOLIC BLOOD PRESSURE: 160 MMHG | BODY MASS INDEX: 18.78 KG/M2 | HEIGHT: 64 IN | DIASTOLIC BLOOD PRESSURE: 80 MMHG | WEIGHT: 110 LBS | HEART RATE: 92 BPM

## 2021-05-25 PROCEDURE — 99024 POSTOP FOLLOW-UP VISIT: CPT

## 2021-06-08 ENCOUNTER — APPOINTMENT (OUTPATIENT)
Dept: SURGERY | Facility: CLINIC | Age: 66
End: 2021-06-08
Payer: MEDICARE

## 2021-06-08 VITALS
DIASTOLIC BLOOD PRESSURE: 90 MMHG | OXYGEN SATURATION: 97 % | HEART RATE: 105 BPM | BODY MASS INDEX: 18.78 KG/M2 | HEIGHT: 64 IN | WEIGHT: 110 LBS | SYSTOLIC BLOOD PRESSURE: 134 MMHG

## 2021-06-08 PROCEDURE — 99024 POSTOP FOLLOW-UP VISIT: CPT

## 2021-07-06 ENCOUNTER — APPOINTMENT (OUTPATIENT)
Dept: ALLERGY | Facility: CLINIC | Age: 66
End: 2021-07-06
Payer: MEDICARE

## 2021-07-06 ENCOUNTER — APPOINTMENT (OUTPATIENT)
Dept: SURGERY | Facility: CLINIC | Age: 66
End: 2021-07-06
Payer: MEDICARE

## 2021-07-06 VITALS
DIASTOLIC BLOOD PRESSURE: 86 MMHG | WEIGHT: 105 LBS | BODY MASS INDEX: 17.93 KG/M2 | OXYGEN SATURATION: 96 % | SYSTOLIC BLOOD PRESSURE: 137 MMHG | HEIGHT: 64 IN | HEART RATE: 109 BPM

## 2021-07-06 VITALS
OXYGEN SATURATION: 97 % | HEIGHT: 64 IN | SYSTOLIC BLOOD PRESSURE: 130 MMHG | DIASTOLIC BLOOD PRESSURE: 79 MMHG | WEIGHT: 105 LBS | BODY MASS INDEX: 17.93 KG/M2 | HEART RATE: 105 BPM

## 2021-07-06 PROCEDURE — 99024 POSTOP FOLLOW-UP VISIT: CPT

## 2021-07-06 PROCEDURE — 95004 PERQ TESTS W/ALRGNC XTRCS: CPT

## 2021-07-06 PROCEDURE — 99204 OFFICE O/P NEW MOD 45 MIN: CPT | Mod: 25

## 2021-07-06 PROCEDURE — 95018 ALL TSTG PERQ&IQ DRUGS/BIOL: CPT

## 2021-07-06 RX ORDER — FLUTICASONE FUROATE AND VILANTEROL TRIFENATATE 200; 25 UG/1; UG/1
200-25 POWDER RESPIRATORY (INHALATION)
Qty: 1 | Refills: 3 | Status: ACTIVE | COMMUNITY
Start: 2021-07-06 | End: 1900-01-01

## 2021-07-06 RX ORDER — TOBRAMYCIN 3 MG/ML
0.3 SOLUTION/ DROPS OPHTHALMIC 4 TIMES DAILY
Qty: 1 | Refills: 1 | Status: DISCONTINUED | COMMUNITY
Start: 2020-11-04 | End: 2021-07-06

## 2021-07-06 NOTE — HISTORY OF PRESENT ILLNESS
[Asthma] : asthma [Allergic Rhinitis] : allergic rhinitis [Eczematous rashes] : eczematous rashes [Venom Reactions] : venom reactions [Food Allergies] : food allergies [de-identified] : Patient with many year history of coughing.   She was advised in the past that her symptoms were secondary to GERD.   Patient had a 24 hour pH monitor - eating test - revealed significant reflux.   Patient had fundoplication 7 weeks ago - Dexilant and Sucralfate with no improvement in her symptoms. \par \par No nasal symptoms.   No associated wheezing or SOB with her coughing.   No history of asthma.  Patient reports having a cough as a child - worsened with URI.   No change with exercise.   Patient has been treated with oral steroids and she improves with this treatment in the past. \par \par Weight loss of 10 pounds after the surgery.

## 2021-07-06 NOTE — ASSESSMENT
[FreeTextEntry1] : Variant - cough asthma:\par \par Trial of Breo 200 QD\par RV environmental intradermal skin testing

## 2021-07-06 NOTE — SOCIAL HISTORY
[House] : [unfilled] lives in a house  [Radiator/Baseboard] : heating provided by radiator(s)/baseboard(s) [Window Units] : air conditioning provided by window units [Bedroom] :  in bedroom [Living Area] : in living area [None] : none [] :  [de-identified] : lives alone [FreeTextEntry2] : Full time grandchildren  [Smokers in Household] : there are no smokers in the home

## 2021-07-06 NOTE — PHYSICAL EXAM
[Alert] : alert [No Acute Distress] : no acute distress [No Neck Mass] : no neck mass was observed [No LAD] : no lymphadenopathy [Normal Rate and Effort] : normal respiratory rhythm and effort [No Crackles] : no crackles [No Retractions] : no retractions [Bilateral Audible Breath Sounds] : bilateral audible breath sounds [Normal Rate] : heart rate was normal  [Normal S1, S2] : normal S1 and S2 [Regular Rhythm] : with a regular rhythm [Normal Cervical Lymph Nodes] : cervical [Skin Intact] : skin intact  [No Rash] : no rash [No Skin Lesions] : no skin lesions [No clubbing] : no clubbing [No Cyanosis] : no cyanosis [Normal Mood] : mood was normal [Normal Affect] : affect was normal [Judgment and Insight Age Appropriate] : judgement and insight is age appropriate [Alert, Awake, Oriented as Age-Appropriate] : alert, awake, oriented as age appropriate [Wheezing] : no wheezing was heard [de-identified] : thin female active coughing

## 2021-07-09 ENCOUNTER — APPOINTMENT (OUTPATIENT)
Dept: PULMONOLOGY | Facility: CLINIC | Age: 66
End: 2021-07-09
Payer: MEDICARE

## 2021-07-09 VITALS
SYSTOLIC BLOOD PRESSURE: 134 MMHG | OXYGEN SATURATION: 99 % | WEIGHT: 105 LBS | HEART RATE: 93 BPM | DIASTOLIC BLOOD PRESSURE: 87 MMHG | BODY MASS INDEX: 17.93 KG/M2 | HEIGHT: 64 IN

## 2021-07-09 PROCEDURE — 71046 X-RAY EXAM CHEST 2 VIEWS: CPT

## 2021-07-09 PROCEDURE — 94729 DIFFUSING CAPACITY: CPT

## 2021-07-09 PROCEDURE — 99204 OFFICE O/P NEW MOD 45 MIN: CPT | Mod: 25

## 2021-07-09 PROCEDURE — ZZZZZ: CPT

## 2021-07-09 PROCEDURE — 94010 BREATHING CAPACITY TEST: CPT

## 2021-07-09 PROCEDURE — 94726 PLETHYSMOGRAPHY LUNG VOLUMES: CPT

## 2021-07-09 NOTE — PROCEDURE
[FreeTextEntry1] : CXR: clear lungs, no focal consolidation or pleural effusions, cardiac silhouette appears normal. kyphosis\par \par PFT: Normal spirometry.  Lung volumes normal. DLCO normal.\par \par

## 2021-07-09 NOTE — ASSESSMENT
[FreeTextEntry1] : ??bronchiectasis\par obtain CT chest\par \par suggest trying breo as previously prescribed by allergist.\par \par fu next week to review CT

## 2021-07-09 NOTE — CONSULT LETTER
[FreeTextEntry1] : Dear ,\par \par I had the pleasure of evaluating your patient, MYLES OROZCO today in pulmonary consultation.  Please refer to my attached note for my findings and recommendations.\par \par \par Thank you for allowing me to participate in the care of your patient, please feel free to call with any questions or concerns.\par \par \par Sincerely,\par \par Jen Kidd MD\par [facility] \par [facility]\par \par

## 2021-07-09 NOTE — HISTORY OF PRESENT ILLNESS
[Never] : never [TextBox_4] : 65F \par \par chronic cough always thought to be due to GERD\par just had fundoplication \par cough still present\par has tried inhalers in the past without relief\par just put on breo by allergy hasn't started yet\par still feeling reflux\par not sob/wheezing\par has lost 30lbs over last 4 years

## 2021-07-13 ENCOUNTER — RESULT REVIEW (OUTPATIENT)
Age: 66
End: 2021-07-13

## 2021-07-14 ENCOUNTER — OUTPATIENT (OUTPATIENT)
Dept: OUTPATIENT SERVICES | Facility: HOSPITAL | Age: 66
LOS: 1 days | End: 2021-07-14
Payer: MEDICARE

## 2021-07-14 ENCOUNTER — APPOINTMENT (OUTPATIENT)
Dept: CT IMAGING | Facility: HOSPITAL | Age: 66
End: 2021-07-14
Payer: MEDICARE

## 2021-07-14 ENCOUNTER — APPOINTMENT (OUTPATIENT)
Dept: RADIOLOGY | Facility: HOSPITAL | Age: 66
End: 2021-07-14
Payer: MEDICARE

## 2021-07-14 DIAGNOSIS — Z98.49 CATARACT EXTRACTION STATUS, UNSPECIFIED EYE: Chronic | ICD-10-CM

## 2021-07-14 DIAGNOSIS — R05 COUGH: ICD-10-CM

## 2021-07-14 DIAGNOSIS — Z98.890 OTHER SPECIFIED POSTPROCEDURAL STATES: Chronic | ICD-10-CM

## 2021-07-14 PROCEDURE — 71250 CT THORAX DX C-: CPT | Mod: ME

## 2021-07-14 PROCEDURE — 71250 CT THORAX DX C-: CPT | Mod: 26,ME

## 2021-07-14 PROCEDURE — G1004: CPT

## 2021-07-14 PROCEDURE — 71046 X-RAY EXAM CHEST 2 VIEWS: CPT | Mod: 26

## 2021-07-14 PROCEDURE — 71046 X-RAY EXAM CHEST 2 VIEWS: CPT

## 2021-07-16 ENCOUNTER — APPOINTMENT (OUTPATIENT)
Dept: PULMONOLOGY | Facility: CLINIC | Age: 66
End: 2021-07-16
Payer: MEDICARE

## 2021-07-16 VITALS
HEIGHT: 64 IN | HEART RATE: 97 BPM | DIASTOLIC BLOOD PRESSURE: 84 MMHG | WEIGHT: 100 LBS | OXYGEN SATURATION: 99 % | BODY MASS INDEX: 17.07 KG/M2 | SYSTOLIC BLOOD PRESSURE: 130 MMHG

## 2021-07-16 DIAGNOSIS — K21.9 GASTRO-ESOPHAGEAL REFLUX DISEASE W/OUT ESOPHAGITIS: ICD-10-CM

## 2021-07-16 DIAGNOSIS — J18.9 PNEUMONIA, UNSPECIFIED ORGANISM: ICD-10-CM

## 2021-07-16 PROCEDURE — 99214 OFFICE O/P EST MOD 30 MIN: CPT

## 2021-07-16 NOTE — PROCEDURE
[FreeTextEntry1] : \par EXAM: CT CHEST\par \par \par PROCEDURE DATE: 07/14/2021\par \par \par \par INTERPRETATION: CLINICAL INDICATION: Chronic cough, hiatal hernia, status post fundoplication in May 2021.\par \par Axial CT images of the chest are obtained without intravenous administration of contrast.\par \par No prior chest CTs are available for comparison.\par \par No enlarged axillary, mediastinal or hilar lymph nodes. 7 mm peripherally calcified nodule within the right lobe of the thyroid gland.\par \par Heart size is normal. No pericardial effusion. Coronary artery calcification within the left anterior descending artery. Vascular calcifications with involvement of the aorta.\par \par No pleural effusions.\par \par Evaluation of the upper abdomen demonstrate postoperative changes in the region of the GE junction related to the given history of fundoplication. Mild to moderate diffuse dilatation of the esophagus containing some internal ingested material.\par \par Evaluation of the lungs demonstrates 2 mm left upper lobe calcified granuloma.\par \par 3 mm left upper lobe pulmonary nodule on image 52 of series 3.\par \par Complete opacification of the right middle lobe with some associated volume loss and internal air bronchograms suggestive of atelectasis, may be on a chronic basis as correlated with the prior chest x-rays dating back to November 14, 2019.\par There is an adjacent and inseparable rounded opacity or consolidation inferior to the right middle lobe, possibly within the adjacent anterior segment of the right lower lobe measuring about 1.8 cm.\par \par Linear subsegmental atelectasis within the lower portions of the right upper lobe adjacent to the anterior mediastinum.\par \par The right middle lobe bronchus appears patent. Some opacification of the lateral segmental bronchus of the right middle lobe may be due to internal secretions.\par \par Degenerative changes of the spine.\par \par IMPRESSION: Complete opacification of the right middle lobe with some associated volume loss suggestive of atelectasis may be on a chronic basis as correlated with the prior chest x-rays.\par \par An inseparable rounded opacity inferior to the right middle lobe possibly within the adjacent right lower lobe may represent an acute infection.\par \par 1-3 month follow-up noncontrast chest CT is recommended for further evaluation.\par \par Mild-to-moderate diffuse dilatation of the esophagus.\par \par --- End of Report ---\par Prior exams reviewed:\par \par CXR: clear lungs, no focal consolidation or pleural effusions, cardiac silhouette appears normal. kyphosis\par \par PFT: Normal spirometry.  Lung volumes normal. DLCO normal.\par \par

## 2021-07-16 NOTE — ASSESSMENT
[FreeTextEntry1] : course of levaquin now\par \par airway clearance regimen nebs bid followed by acapella\par \par repeat chest ct 6 weeks\par \par may need bronch if no improvement, HARJINDER?\par \par follow up with GI/sugeon given GERD/post op/dilated esophagus\par cont ppi for GERD which was likely precipitator for bronchiectasis.

## 2021-07-16 NOTE — HISTORY OF PRESENT ILLNESS
[TextBox_4] : chronic GERD/cough\par \par had ct chest confirmed bronchiectasis +/- acute pna\par \par still with chronic cough/fatigue/weight loss

## 2021-07-20 ENCOUNTER — APPOINTMENT (OUTPATIENT)
Dept: ALLERGY | Facility: CLINIC | Age: 66
End: 2021-07-20

## 2021-07-20 RX ORDER — NEBULIZER ACCESSORIES
KIT MISCELLANEOUS
Qty: 1 | Refills: 0 | Status: ACTIVE | COMMUNITY
Start: 2021-07-16 | End: 1900-01-01

## 2021-08-02 ENCOUNTER — APPOINTMENT (OUTPATIENT)
Dept: ULTRASOUND IMAGING | Facility: HOSPITAL | Age: 66
End: 2021-08-02
Payer: MEDICARE

## 2021-08-02 ENCOUNTER — APPOINTMENT (OUTPATIENT)
Dept: RADIOLOGY | Facility: HOSPITAL | Age: 66
End: 2021-08-02
Payer: MEDICARE

## 2021-08-02 ENCOUNTER — APPOINTMENT (OUTPATIENT)
Dept: MAMMOGRAPHY | Facility: HOSPITAL | Age: 66
End: 2021-08-02
Payer: MEDICARE

## 2021-08-02 ENCOUNTER — OUTPATIENT (OUTPATIENT)
Dept: OUTPATIENT SERVICES | Facility: HOSPITAL | Age: 66
LOS: 1 days | End: 2021-08-02
Payer: MEDICARE

## 2021-08-02 DIAGNOSIS — Z98.49 CATARACT EXTRACTION STATUS, UNSPECIFIED EYE: Chronic | ICD-10-CM

## 2021-08-02 DIAGNOSIS — Z98.890 OTHER SPECIFIED POSTPROCEDURAL STATES: Chronic | ICD-10-CM

## 2021-08-02 DIAGNOSIS — R92.2 INCONCLUSIVE MAMMOGRAM: ICD-10-CM

## 2021-08-02 DIAGNOSIS — Z12.31 ENCOUNTER FOR SCREENING MAMMOGRAM FOR MALIGNANT NEOPLASM OF BREAST: ICD-10-CM

## 2021-08-02 PROCEDURE — 77067 SCR MAMMO BI INCL CAD: CPT | Mod: 26

## 2021-08-02 PROCEDURE — 77080 DXA BONE DENSITY AXIAL: CPT | Mod: 26

## 2021-08-02 PROCEDURE — 77063 BREAST TOMOSYNTHESIS BI: CPT | Mod: 26

## 2021-08-02 PROCEDURE — 76641 ULTRASOUND BREAST COMPLETE: CPT

## 2021-08-02 PROCEDURE — 76641 ULTRASOUND BREAST COMPLETE: CPT | Mod: 26,50

## 2021-08-02 PROCEDURE — 77063 BREAST TOMOSYNTHESIS BI: CPT

## 2021-08-02 PROCEDURE — 77080 DXA BONE DENSITY AXIAL: CPT

## 2021-08-02 PROCEDURE — 77067 SCR MAMMO BI INCL CAD: CPT

## 2021-08-06 ENCOUNTER — APPOINTMENT (OUTPATIENT)
Dept: PULMONOLOGY | Facility: CLINIC | Age: 66
End: 2021-08-06
Payer: MEDICARE

## 2021-08-06 VITALS
DIASTOLIC BLOOD PRESSURE: 80 MMHG | HEART RATE: 90 BPM | OXYGEN SATURATION: 99 % | BODY MASS INDEX: 17.07 KG/M2 | HEIGHT: 64 IN | WEIGHT: 100 LBS | SYSTOLIC BLOOD PRESSURE: 140 MMHG

## 2021-08-06 PROCEDURE — 99214 OFFICE O/P EST MOD 30 MIN: CPT

## 2021-08-06 NOTE — ASSESSMENT
[FreeTextEntry1] : cont airway clearance regimen, much improved\par \par will repeat ct in sept to eval for improvement

## 2021-08-06 NOTE — PROCEDURE
[FreeTextEntry1] : Reviewed:\par EXAM: CT CHEST\par \par \par PROCEDURE DATE: 07/14/2021\par \par \par \par INTERPRETATION: CLINICAL INDICATION: Chronic cough, hiatal hernia, status post fundoplication in May 2021.\par \par Axial CT images of the chest are obtained without intravenous administration of contrast.\par \par No prior chest CTs are available for comparison.\par \par No enlarged axillary, mediastinal or hilar lymph nodes. 7 mm peripherally calcified nodule within the right lobe of the thyroid gland.\par \par Heart size is normal. No pericardial effusion. Coronary artery calcification within the left anterior descending artery. Vascular calcifications with involvement of the aorta.\par \par No pleural effusions.\par \par Evaluation of the upper abdomen demonstrate postoperative changes in the region of the GE junction related to the given history of fundoplication. Mild to moderate diffuse dilatation of the esophagus containing some internal ingested material.\par \par Evaluation of the lungs demonstrates 2 mm left upper lobe calcified granuloma.\par \par 3 mm left upper lobe pulmonary nodule on image 52 of series 3.\par \par Complete opacification of the right middle lobe with some associated volume loss and internal air bronchograms suggestive of atelectasis, may be on a chronic basis as correlated with the prior chest x-rays dating back to November 14, 2019.\par There is an adjacent and inseparable rounded opacity or consolidation inferior to the right middle lobe, possibly within the adjacent anterior segment of the right lower lobe measuring about 1.8 cm.\par \par Linear subsegmental atelectasis within the lower portions of the right upper lobe adjacent to the anterior mediastinum.\par \par The right middle lobe bronchus appears patent. Some opacification of the lateral segmental bronchus of the right middle lobe may be due to internal secretions.\par \par Degenerative changes of the spine.\par \par IMPRESSION: Complete opacification of the right middle lobe with some associated volume loss suggestive of atelectasis may be on a chronic basis as correlated with the prior chest x-rays.\par \par An inseparable rounded opacity inferior to the right middle lobe possibly within the adjacent right lower lobe may represent an acute infection.\par \par 1-3 month follow-up noncontrast chest CT is recommended for further evaluation.\par \par Mild-to-moderate diffuse dilatation of the esophagus.\par \par --- End of Report ---\par Prior exams reviewed:\par \par CXR: clear lungs, no focal consolidation or pleural effusions, cardiac silhouette appears normal. kyphosis\par \par PFT: Normal spirometry.  Lung volumes normal. DLCO normal.\par \par

## 2021-08-06 NOTE — HISTORY OF PRESENT ILLNESS
[Never] : never [TextBox_4] : s/p levaquin \par on nebs and acapella\par coughing much less\par says she is about "60% better"

## 2021-09-07 ENCOUNTER — OUTPATIENT (OUTPATIENT)
Dept: OUTPATIENT SERVICES | Facility: HOSPITAL | Age: 66
LOS: 1 days | End: 2021-09-07
Payer: MEDICARE

## 2021-09-07 ENCOUNTER — APPOINTMENT (OUTPATIENT)
Dept: CT IMAGING | Facility: HOSPITAL | Age: 66
End: 2021-09-07
Payer: MEDICARE

## 2021-09-07 DIAGNOSIS — J47.9 BRONCHIECTASIS, UNCOMPLICATED: ICD-10-CM

## 2021-09-07 DIAGNOSIS — Z98.890 OTHER SPECIFIED POSTPROCEDURAL STATES: Chronic | ICD-10-CM

## 2021-09-07 DIAGNOSIS — Z98.49 CATARACT EXTRACTION STATUS, UNSPECIFIED EYE: Chronic | ICD-10-CM

## 2021-09-07 PROCEDURE — 71250 CT THORAX DX C-: CPT | Mod: 26,MH

## 2021-09-07 PROCEDURE — 71250 CT THORAX DX C-: CPT

## 2021-09-10 ENCOUNTER — APPOINTMENT (OUTPATIENT)
Dept: PULMONOLOGY | Facility: CLINIC | Age: 66
End: 2021-09-10
Payer: MEDICARE

## 2021-09-10 VITALS
BODY MASS INDEX: 16.73 KG/M2 | WEIGHT: 98 LBS | OXYGEN SATURATION: 97 % | HEIGHT: 64 IN | HEART RATE: 92 BPM | SYSTOLIC BLOOD PRESSURE: 139 MMHG | DIASTOLIC BLOOD PRESSURE: 90 MMHG

## 2021-09-10 DIAGNOSIS — Z01.812 ENCOUNTER FOR PREPROCEDURAL LABORATORY EXAMINATION: ICD-10-CM

## 2021-09-10 DIAGNOSIS — Z20.822 ENCOUNTER FOR PREPROCEDURAL LABORATORY EXAMINATION: ICD-10-CM

## 2021-09-10 PROCEDURE — 99214 OFFICE O/P EST MOD 30 MIN: CPT

## 2021-09-10 NOTE — PROCEDURE
[FreeTextEntry1] : \par \par EXAM: CT CHEST\par \par \par PROCEDURE DATE: 09/07/2021\par \par \par \par INTERPRETATION: .\par ACC: [id number]\par INDICATION: Bronchiectasis\par TECHNIQUE: Unenhanced CT of the chest. Coronal and sagittal images were reconstructed. Maximum intensity projection images were generated.\par COMPARISON: 7/14/2021\par \par FINDINGS:\par \par AIRWAYS, LUNGS and PLEURA: Patent central airways. Bronchiectasis and mucoid impactions within the right upper lobe and right middle lobe are decreased. Improved aeration of the right middle lobe. The lungs are otherwise clear. No pleural effusion.\par \par MEDIASTINUM AND MASSIEL: No lymphadenopathy.\par \par HEART AND VESSELS: Heart size is normal. Mild aortic and coronary calcification. No pericardial effusion. Thoracic aorta and pulmonary artery normal in diameter.\par \par VISUALIZED UPPER ABDOMEN: Within normal limits.\par \par CHEST WALL AND BONES: No aggressive osseous lesion. Pectus carinatum.\par \par LOWER NECK: Within normal limits.\par \par IMPRESSION:\par \par Mucoid impactions within the anterior right upper lobe and right middle lobe are slightly decreased and there is improved aeration of the right middle lobe.\par \par --- End of Report ---\par \par \par JUDAH GRAVES MD; Attending Radiologist\par This document has been electronically signed. Sep 8 2021 4:04PM\par \par \par \par \par Reviewed:\par EXAM: CT CHEST\par \par \par PROCEDURE DATE: 07/14/2021\par \par \par \par INTERPRETATION: CLINICAL INDICATION: Chronic cough, hiatal hernia, status post fundoplication in May 2021.\par \par Axial CT images of the chest are obtained without intravenous administration of contrast.\par \par No prior chest CTs are available for comparison.\par \par No enlarged axillary, mediastinal or hilar lymph nodes. 7 mm peripherally calcified nodule within the right lobe of the thyroid gland.\par \par Heart size is normal. No pericardial effusion. Coronary artery calcification within the left anterior descending artery. Vascular calcifications with involvement of the aorta.\par \par No pleural effusions.\par \par Evaluation of the upper abdomen demonstrate postoperative changes in the region of the GE junction related to the given history of fundoplication. Mild to moderate diffuse dilatation of the esophagus containing some internal ingested material.\par \par Evaluation of the lungs demonstrates 2 mm left upper lobe calcified granuloma.\par \par 3 mm left upper lobe pulmonary nodule on image 52 of series 3.\par \par Complete opacification of the right middle lobe with some associated volume loss and internal air bronchograms suggestive of atelectasis, may be on a chronic basis as correlated with the prior chest x-rays dating back to November 14, 2019.\par There is an adjacent and inseparable rounded opacity or consolidation inferior to the right middle lobe, possibly within the adjacent anterior segment of the right lower lobe measuring about 1.8 cm.\par \par Linear subsegmental atelectasis within the lower portions of the right upper lobe adjacent to the anterior mediastinum.\par \par The right middle lobe bronchus appears patent. Some opacification of the lateral segmental bronchus of the right middle lobe may be due to internal secretions.\par \par Degenerative changes of the spine.\par \par IMPRESSION: Complete opacification of the right middle lobe with some associated volume loss suggestive of atelectasis may be on a chronic basis as correlated with the prior chest x-rays.\par \par An inseparable rounded opacity inferior to the right middle lobe possibly within the adjacent right lower lobe may represent an acute infection.\par \par 1-3 month follow-up noncontrast chest CT is recommended for further evaluation.\par \par Mild-to-moderate diffuse dilatation of the esophagus.\par \par --- End of Report ---\par Prior exams reviewed:\par \par CXR: clear lungs, no focal consolidation or pleural effusions, cardiac silhouette appears normal. kyphosis\par \par PFT: Normal spirometry.  Lung volumes normal. DLCO normal.\par \par

## 2021-10-08 ENCOUNTER — APPOINTMENT (OUTPATIENT)
Dept: PULMONOLOGY | Facility: CLINIC | Age: 66
End: 2021-10-08
Payer: MEDICARE

## 2021-10-08 VITALS
WEIGHT: 110 LBS | HEIGHT: 64 IN | BODY MASS INDEX: 18.78 KG/M2 | HEART RATE: 102 BPM | OXYGEN SATURATION: 97 % | DIASTOLIC BLOOD PRESSURE: 91 MMHG | SYSTOLIC BLOOD PRESSURE: 147 MMHG

## 2021-10-08 PROCEDURE — 99214 OFFICE O/P EST MOD 30 MIN: CPT

## 2021-10-08 NOTE — PROCEDURE
[FreeTextEntry1] : Prior exams reviewed:\par \par EXAM: CT CHEST\par \par \par PROCEDURE DATE: 09/07/2021\par \par \par \par INTERPRETATION: .\par ACC: [id number]\par INDICATION: Bronchiectasis\par TECHNIQUE: Unenhanced CT of the chest. Coronal and sagittal images were reconstructed. Maximum intensity projection images were generated.\par COMPARISON: 7/14/2021\par \par FINDINGS:\par \par AIRWAYS, LUNGS and PLEURA: Patent central airways. Bronchiectasis and mucoid impactions within the right upper lobe and right middle lobe are decreased. Improved aeration of the right middle lobe. The lungs are otherwise clear. No pleural effusion.\par \par MEDIASTINUM AND MASSIEL: No lymphadenopathy.\par \par HEART AND VESSELS: Heart size is normal. Mild aortic and coronary calcification. No pericardial effusion. Thoracic aorta and pulmonary artery normal in diameter.\par \par VISUALIZED UPPER ABDOMEN: Within normal limits.\par \par CHEST WALL AND BONES: No aggressive osseous lesion. Pectus carinatum.\par \par LOWER NECK: Within normal limits.\par \par IMPRESSION:\par \par Mucoid impactions within the anterior right upper lobe and right middle lobe are slightly decreased and there is improved aeration of the right middle lobe.\par \par --- End of Report ---\par \par \par JUDAH GRAVES MD; Attending Radiologist\par This document has been electronically signed. Sep 8 2021 4:04PM\par \par \par \par \par Reviewed:\par EXAM: CT CHEST\par \par \par PROCEDURE DATE: 07/14/2021\par \par \par \par INTERPRETATION: CLINICAL INDICATION: Chronic cough, hiatal hernia, status post fundoplication in May 2021.\par \par Axial CT images of the chest are obtained without intravenous administration of contrast.\par \par No prior chest CTs are available for comparison.\par \par No enlarged axillary, mediastinal or hilar lymph nodes. 7 mm peripherally calcified nodule within the right lobe of the thyroid gland.\par \par Heart size is normal. No pericardial effusion. Coronary artery calcification within the left anterior descending artery. Vascular calcifications with involvement of the aorta.\par \par No pleural effusions.\par \par Evaluation of the upper abdomen demonstrate postoperative changes in the region of the GE junction related to the given history of fundoplication. Mild to moderate diffuse dilatation of the esophagus containing some internal ingested material.\par \par Evaluation of the lungs demonstrates 2 mm left upper lobe calcified granuloma.\par \par 3 mm left upper lobe pulmonary nodule on image 52 of series 3.\par \par Complete opacification of the right middle lobe with some associated volume loss and internal air bronchograms suggestive of atelectasis, may be on a chronic basis as correlated with the prior chest x-rays dating back to November 14, 2019.\par There is an adjacent and inseparable rounded opacity or consolidation inferior to the right middle lobe, possibly within the adjacent anterior segment of the right lower lobe measuring about 1.8 cm.\par \par Linear subsegmental atelectasis within the lower portions of the right upper lobe adjacent to the anterior mediastinum.\par \par The right middle lobe bronchus appears patent. Some opacification of the lateral segmental bronchus of the right middle lobe may be due to internal secretions.\par \par Degenerative changes of the spine.\par \par IMPRESSION: Complete opacification of the right middle lobe with some associated volume loss suggestive of atelectasis may be on a chronic basis as correlated with the prior chest x-rays.\par \par An inseparable rounded opacity inferior to the right middle lobe possibly within the adjacent right lower lobe may represent an acute infection.\par \par 1-3 month follow-up noncontrast chest CT is recommended for further evaluation.\par \par Mild-to-moderate diffuse dilatation of the esophagus.\par \par --- End of Report ---\par Prior exams reviewed:\par \par CXR: clear lungs, no focal consolidation or pleural effusions, cardiac silhouette appears normal. kyphosis\par \par PFT: Normal spirometry.  Lung volumes normal. DLCO normal.\par \par

## 2021-10-08 NOTE — HISTORY OF PRESENT ILLNESS
[TextBox_4] : tariq were sick\par now having increased sputum and cough, worse than baseline\par no fever/chills\par reduced her neb txs after last visit

## 2021-10-08 NOTE — ASSESSMENT
[FreeTextEntry1] : increase nebs/acapella to TID for next week, then BID going forward\par levaquin now

## 2022-01-17 ENCOUNTER — APPOINTMENT (OUTPATIENT)
Dept: PULMONOLOGY | Facility: CLINIC | Age: 67
End: 2022-01-17
Payer: MEDICARE

## 2022-01-17 VITALS
BODY MASS INDEX: 20.49 KG/M2 | DIASTOLIC BLOOD PRESSURE: 88 MMHG | SYSTOLIC BLOOD PRESSURE: 136 MMHG | HEIGHT: 64 IN | HEART RATE: 83 BPM | OXYGEN SATURATION: 98 % | WEIGHT: 120 LBS

## 2022-01-17 PROCEDURE — 99214 OFFICE O/P EST MOD 30 MIN: CPT

## 2022-01-17 RX ORDER — DEXLANSOPRAZOLE 60 MG/1
60 CAPSULE, DELAYED RELEASE ORAL
Refills: 0 | Status: DISCONTINUED | COMMUNITY
End: 2022-01-17

## 2022-01-17 RX ORDER — LEVOFLOXACIN 750 MG/1
750 TABLET, FILM COATED ORAL DAILY
Qty: 7 | Refills: 0 | Status: DISCONTINUED | COMMUNITY
Start: 2021-07-16 | End: 2022-01-17

## 2022-01-17 RX ORDER — SUCRALFATE 1 G/10ML
1 SUSPENSION ORAL
Refills: 0 | Status: DISCONTINUED | COMMUNITY
End: 2022-01-17

## 2022-01-17 NOTE — PROCEDURE
[FreeTextEntry1] : Prior exams reviewed:\par \par EXAM: CT CHEST\par \par \par PROCEDURE DATE: 09/07/2021\par \par \par \par INTERPRETATION: .\par ACC: [id number]\par INDICATION: Bronchiectasis\par TECHNIQUE: Unenhanced CT of the chest. Coronal and sagittal images were reconstructed. Maximum intensity projection images were generated.\par COMPARISON: 7/14/2021\par \par FINDINGS:\par \par AIRWAYS, LUNGS and PLEURA: Patent central airways. Bronchiectasis and mucoid impactions within the right upper lobe and right middle lobe are decreased. Improved aeration of the right middle lobe. The lungs are otherwise clear. No pleural effusion.\par \par MEDIASTINUM AND MASSIEL: No lymphadenopathy.\par \par HEART AND VESSELS: Heart size is normal. Mild aortic and coronary calcification. No pericardial effusion. Thoracic aorta and pulmonary artery normal in diameter.\par \par VISUALIZED UPPER ABDOMEN: Within normal limits.\par \par CHEST WALL AND BONES: No aggressive osseous lesion. Pectus carinatum.\par \par LOWER NECK: Within normal limits.\par \par IMPRESSION:\par \par Mucoid impactions within the anterior right upper lobe and right middle lobe are slightly decreased and there is improved aeration of the right middle lobe.\par \par --- End of Report ---\par \par \par JUDAH GRAVES MD; Attending Radiologist\par This document has been electronically signed. Sep 8 2021 4:04PM\par \par \par \par \par Reviewed:\par EXAM: CT CHEST\par \par \par PROCEDURE DATE: 07/14/2021\par \par \par \par INTERPRETATION: CLINICAL INDICATION: Chronic cough, hiatal hernia, status post fundoplication in May 2021.\par \par Axial CT images of the chest are obtained without intravenous administration of contrast.\par \par No prior chest CTs are available for comparison.\par \par No enlarged axillary, mediastinal or hilar lymph nodes. 7 mm peripherally calcified nodule within the right lobe of the thyroid gland.\par \par Heart size is normal. No pericardial effusion. Coronary artery calcification within the left anterior descending artery. Vascular calcifications with involvement of the aorta.\par \par No pleural effusions.\par \par Evaluation of the upper abdomen demonstrate postoperative changes in the region of the GE junction related to the given history of fundoplication. Mild to moderate diffuse dilatation of the esophagus containing some internal ingested material.\par \par Evaluation of the lungs demonstrates 2 mm left upper lobe calcified granuloma.\par \par 3 mm left upper lobe pulmonary nodule on image 52 of series 3.\par \par Complete opacification of the right middle lobe with some associated volume loss and internal air bronchograms suggestive of atelectasis, may be on a chronic basis as correlated with the prior chest x-rays dating back to November 14, 2019.\par There is an adjacent and inseparable rounded opacity or consolidation inferior to the right middle lobe, possibly within the adjacent anterior segment of the right lower lobe measuring about 1.8 cm.\par \par Linear subsegmental atelectasis within the lower portions of the right upper lobe adjacent to the anterior mediastinum.\par \par The right middle lobe bronchus appears patent. Some opacification of the lateral segmental bronchus of the right middle lobe may be due to internal secretions.\par \par Degenerative changes of the spine.\par \par IMPRESSION: Complete opacification of the right middle lobe with some associated volume loss suggestive of atelectasis may be on a chronic basis as correlated with the prior chest x-rays.\par \par An inseparable rounded opacity inferior to the right middle lobe possibly within the adjacent right lower lobe may represent an acute infection.\par \par 1-3 month follow-up noncontrast chest CT is recommended for further evaluation.\par \par Mild-to-moderate diffuse dilatation of the esophagus.\par \par --- End of Report ---\par Prior exams reviewed:\par \par CXR: clear lungs, no focal consolidation or pleural effusions, cardiac silhouette appears normal. kyphosis\par \par PFT: Normal spirometry.  Lung volumes normal. DLCO normal.\par \par

## 2022-01-17 NOTE — ASSESSMENT
[FreeTextEntry1] : strongly encouraged neb and acapella use AT LEAST daily to prevent infections\par \par repeat PFT 3 mo\par repeat ct chest by sept 2022, sooner if clinical change.

## 2022-01-17 NOTE — HISTORY OF PRESENT ILLNESS
[TextBox_4] : doing well\par no recent illness\par fully covid vaxed and boostered\par does neb once a day, no using airway clearance device bc feels fine

## 2022-04-07 ENCOUNTER — APPOINTMENT (OUTPATIENT)
Dept: PULMONOLOGY | Facility: CLINIC | Age: 67
End: 2022-04-07
Payer: MEDICARE

## 2022-04-07 VITALS — SYSTOLIC BLOOD PRESSURE: 136 MMHG | HEART RATE: 83 BPM | DIASTOLIC BLOOD PRESSURE: 88 MMHG | OXYGEN SATURATION: 97 %

## 2022-04-07 PROCEDURE — 94729 DIFFUSING CAPACITY: CPT

## 2022-04-07 PROCEDURE — 99214 OFFICE O/P EST MOD 30 MIN: CPT | Mod: 25

## 2022-04-07 PROCEDURE — 94010 BREATHING CAPACITY TEST: CPT

## 2022-04-07 PROCEDURE — ZZZZZ: CPT

## 2022-04-07 PROCEDURE — 94726 PLETHYSMOGRAPHY LUNG VOLUMES: CPT

## 2022-04-07 NOTE — ASSESSMENT
[FreeTextEntry1] : doing well\par cont airway clearance with nebs/acapella\par repeat ct end of summer

## 2022-04-07 NOTE — PROCEDURE
[FreeTextEntry1] : PFT: Mild obstruction.  Lung volumes normal. DLCO mildly reduced.\par \par \par \par Prior exams reviewed:\par \par EXAM: CT CHEST\par \par \par PROCEDURE DATE: 09/07/2021\par \par \par \par INTERPRETATION: .\par ACC: [id number]\par INDICATION: Bronchiectasis\par TECHNIQUE: Unenhanced CT of the chest. Coronal and sagittal images were reconstructed. Maximum intensity projection images were generated.\par COMPARISON: 7/14/2021\par \par FINDINGS:\par \par AIRWAYS, LUNGS and PLEURA: Patent central airways. Bronchiectasis and mucoid impactions within the right upper lobe and right middle lobe are decreased. Improved aeration of the right middle lobe. The lungs are otherwise clear. No pleural effusion.\par \par MEDIASTINUM AND MASSIEL: No lymphadenopathy.\par \par HEART AND VESSELS: Heart size is normal. Mild aortic and coronary calcification. No pericardial effusion. Thoracic aorta and pulmonary artery normal in diameter.\par \par VISUALIZED UPPER ABDOMEN: Within normal limits.\par \par CHEST WALL AND BONES: No aggressive osseous lesion. Pectus carinatum.\par \par LOWER NECK: Within normal limits.\par \par IMPRESSION:\par \par Mucoid impactions within the anterior right upper lobe and right middle lobe are slightly decreased and there is improved aeration of the right middle lobe.\par \par --- End of Report ---\par \par \par JUDAH GRAVES MD; Attending Radiologist\par This document has been electronically signed. Sep 8 2021 4:04PM\par \par \par \par \par Reviewed:\par EXAM: CT CHEST\par \par \par PROCEDURE DATE: 07/14/2021\par \par \par \par INTERPRETATION: CLINICAL INDICATION: Chronic cough, hiatal hernia, status post fundoplication in May 2021.\par \par Axial CT images of the chest are obtained without intravenous administration of contrast.\par \par No prior chest CTs are available for comparison.\par \par No enlarged axillary, mediastinal or hilar lymph nodes. 7 mm peripherally calcified nodule within the right lobe of the thyroid gland.\par \par Heart size is normal. No pericardial effusion. Coronary artery calcification within the left anterior descending artery. Vascular calcifications with involvement of the aorta.\par \par No pleural effusions.\par \par Evaluation of the upper abdomen demonstrate postoperative changes in the region of the GE junction related to the given history of fundoplication. Mild to moderate diffuse dilatation of the esophagus containing some internal ingested material.\par \par Evaluation of the lungs demonstrates 2 mm left upper lobe calcified granuloma.\par \par 3 mm left upper lobe pulmonary nodule on image 52 of series 3.\par \par Complete opacification of the right middle lobe with some associated volume loss and internal air bronchograms suggestive of atelectasis, may be on a chronic basis as correlated with the prior chest x-rays dating back to November 14, 2019.\par There is an adjacent and inseparable rounded opacity or consolidation inferior to the right middle lobe, possibly within the adjacent anterior segment of the right lower lobe measuring about 1.8 cm.\par \par Linear subsegmental atelectasis within the lower portions of the right upper lobe adjacent to the anterior mediastinum.\par \par The right middle lobe bronchus appears patent. Some opacification of the lateral segmental bronchus of the right middle lobe may be due to internal secretions.\par \par Degenerative changes of the spine.\par \par IMPRESSION: Complete opacification of the right middle lobe with some associated volume loss suggestive of atelectasis may be on a chronic basis as correlated with the prior chest x-rays.\par \par An inseparable rounded opacity inferior to the right middle lobe possibly within the adjacent right lower lobe may represent an acute infection.\par \par 1-3 month follow-up noncontrast chest CT is recommended for further evaluation.\par \par Mild-to-moderate diffuse dilatation of the esophagus.\par \par --- End of Report ---\par Prior exams reviewed:\par \par CXR: clear lungs, no focal consolidation or pleural effusions, cardiac silhouette appears normal. kyphosis\par \par PFT: Normal spirometry.  Lung volumes normal. DLCO normal.\par \par

## 2022-08-08 ENCOUNTER — APPOINTMENT (OUTPATIENT)
Dept: CT IMAGING | Facility: HOSPITAL | Age: 67
End: 2022-08-08

## 2022-08-08 ENCOUNTER — OUTPATIENT (OUTPATIENT)
Dept: OUTPATIENT SERVICES | Facility: HOSPITAL | Age: 67
LOS: 1 days | End: 2022-08-08
Payer: MEDICARE

## 2022-08-08 DIAGNOSIS — J47.9 BRONCHIECTASIS, UNCOMPLICATED: ICD-10-CM

## 2022-08-08 DIAGNOSIS — Z98.890 OTHER SPECIFIED POSTPROCEDURAL STATES: Chronic | ICD-10-CM

## 2022-08-08 DIAGNOSIS — Z98.49 CATARACT EXTRACTION STATUS, UNSPECIFIED EYE: Chronic | ICD-10-CM

## 2022-08-08 PROCEDURE — 71250 CT THORAX DX C-: CPT | Mod: 26,ME

## 2022-08-08 PROCEDURE — G1004: CPT

## 2022-08-08 PROCEDURE — 71250 CT THORAX DX C-: CPT | Mod: ME

## 2022-08-15 ENCOUNTER — APPOINTMENT (OUTPATIENT)
Dept: PULMONOLOGY | Facility: CLINIC | Age: 67
End: 2022-08-15

## 2022-08-15 VITALS
BODY MASS INDEX: 17.75 KG/M2 | HEART RATE: 86 BPM | WEIGHT: 104 LBS | SYSTOLIC BLOOD PRESSURE: 138 MMHG | DIASTOLIC BLOOD PRESSURE: 91 MMHG | OXYGEN SATURATION: 99 % | HEIGHT: 64 IN

## 2022-08-15 PROCEDURE — 36415 COLL VENOUS BLD VENIPUNCTURE: CPT

## 2022-08-15 PROCEDURE — 99214 OFFICE O/P EST MOD 30 MIN: CPT | Mod: 25

## 2022-08-15 NOTE — ASSESSMENT
[FreeTextEntry1] : RML collapse worse than prior\par suggest bronchoscopy to eval airways, send cultures/cyto etc.\par cont nebs bid \par pre op labs today\par will get med clearance from pcp.

## 2022-08-15 NOTE — PROCEDURE
[FreeTextEntry1] : \par ACC: [id number] EXAM: CT CHEST\par \par PROCEDURE DATE: 08/08/2022\par \par \par \par INTERPRETATION: INDICATION: follow up bronchiectasis\par TECHNIQUE: Unenhanced CT of the chest. Coronal, sagittal, and MIP images were reconstructed and reviewed.\par COMPARISON: 9/7/2021 chest CT.\par \par FINDINGS:\par \par AIRWAYS, LUNGS and PLEURA: Stable mucoid impactions, bronchiectasis and partial opacification of the right middle lobe. Mild mucoid impaction of anterior right upper lobe is unchanged. The lungs are otherwise clear. No pleural effusion.\par \par MEDIASTINUM AND MASSIEL: No lymphadenopathy.\par \par HEART AND VESSELS: Heart size is normal. No pericardial effusion. Thoracic aorta and pulmonary artery normal in diameter. Mild coronary and aortic calcifications.\par \par VISUALIZED UPPER ABDOMEN: Within normal limits.\par \par CHEST WALL AND BONES: No aggressive osseous lesion.\par \par LOWER NECK: Within normal limits.\par \par IMPRESSION:\par \par Stable mucoid impactions, bronchiectasis and partial opacification of the right middle lobe.\par \par --- End of Report ---\par \par \par \par \par \par JUDAH GRAVES MD; Attending Radiologist\par This document has been electronically signed. Aug 10 2022 3:34PM\par \par \par \par Prior exams reviewed:\par \par PFT: Mild obstruction.  Lung volumes normal. DLCO mildly reduced.\par \par \par EXAM: CT CHEST\par \par \par PROCEDURE DATE: 09/07/2021\par \par \par \par INTERPRETATION: .\par ACC: [id number]\par INDICATION: Bronchiectasis\par TECHNIQUE: Unenhanced CT of the chest. Coronal and sagittal images were reconstructed. Maximum intensity projection images were generated.\par COMPARISON: 7/14/2021\par \par FINDINGS:\par \par AIRWAYS, LUNGS and PLEURA: Patent central airways. Bronchiectasis and mucoid impactions within the right upper lobe and right middle lobe are decreased. Improved aeration of the right middle lobe. The lungs are otherwise clear. No pleural effusion.\par \par MEDIASTINUM AND MASSIEL: No lymphadenopathy.\par \par HEART AND VESSELS: Heart size is normal. Mild aortic and coronary calcification. No pericardial effusion. Thoracic aorta and pulmonary artery normal in diameter.\par \par VISUALIZED UPPER ABDOMEN: Within normal limits.\par \par CHEST WALL AND BONES: No aggressive osseous lesion. Pectus carinatum.\par \par LOWER NECK: Within normal limits.\par \par IMPRESSION:\par \par Mucoid impactions within the anterior right upper lobe and right middle lobe are slightly decreased and there is improved aeration of the right middle lobe.\par \par --- End of Report ---\par \par \par JUDAH GRAVES MD; Attending Radiologist\par This document has been electronically signed. Sep 8 2021 4:04PM\par \par \par \par \par Reviewed:\par EXAM: CT CHEST\par \par \par PROCEDURE DATE: 07/14/2021\par \par \par \par INTERPRETATION: CLINICAL INDICATION: Chronic cough, hiatal hernia, status post fundoplication in May 2021.\par \par Axial CT images of the chest are obtained without intravenous administration of contrast.\par \par No prior chest CTs are available for comparison.\par \par No enlarged axillary, mediastinal or hilar lymph nodes. 7 mm peripherally calcified nodule within the right lobe of the thyroid gland.\par \par Heart size is normal. No pericardial effusion. Coronary artery calcification within the left anterior descending artery. Vascular calcifications with involvement of the aorta.\par \par No pleural effusions.\par \par Evaluation of the upper abdomen demonstrate postoperative changes in the region of the GE junction related to the given history of fundoplication. Mild to moderate diffuse dilatation of the esophagus containing some internal ingested material.\par \par Evaluation of the lungs demonstrates 2 mm left upper lobe calcified granuloma.\par \par 3 mm left upper lobe pulmonary nodule on image 52 of series 3.\par \par Complete opacification of the right middle lobe with some associated volume loss and internal air bronchograms suggestive of atelectasis, may be on a chronic basis as correlated with the prior chest x-rays dating back to November 14, 2019.\par There is an adjacent and inseparable rounded opacity or consolidation inferior to the right middle lobe, possibly within the adjacent anterior segment of the right lower lobe measuring about 1.8 cm.\par \par Linear subsegmental atelectasis within the lower portions of the right upper lobe adjacent to the anterior mediastinum.\par \par The right middle lobe bronchus appears patent. Some opacification of the lateral segmental bronchus of the right middle lobe may be due to internal secretions.\par \par Degenerative changes of the spine.\par \par IMPRESSION: Complete opacification of the right middle lobe with some associated volume loss suggestive of atelectasis may be on a chronic basis as correlated with the prior chest x-rays.\par \par An inseparable rounded opacity inferior to the right middle lobe possibly within the adjacent right lower lobe may represent an acute infection.\par \par 1-3 month follow-up noncontrast chest CT is recommended for further evaluation.\par \par Mild-to-moderate diffuse dilatation of the esophagus.\par \par --- End of Report ---\par Prior exams reviewed:\par \par CXR: clear lungs, no focal consolidation or pleural effusions, cardiac silhouette appears normal. kyphosis\par \par PFT: Normal spirometry.  Lung volumes normal. DLCO normal.\par \par

## 2022-08-16 LAB
ALBUMIN SERPL ELPH-MCNC: 4.7 G/DL
ALP BLD-CCNC: 73 U/L
ALT SERPL-CCNC: 16 U/L
ANION GAP SERPL CALC-SCNC: 17 MMOL/L
APTT BLD: 41.4 SEC
AST SERPL-CCNC: 27 U/L
BASOPHILS # BLD AUTO: 0.08 K/UL
BASOPHILS NFR BLD AUTO: 1.4 %
BILIRUB SERPL-MCNC: 0.2 MG/DL
BUN SERPL-MCNC: 16 MG/DL
CALCIUM SERPL-MCNC: 9.8 MG/DL
CHLORIDE SERPL-SCNC: 100 MMOL/L
CO2 SERPL-SCNC: 22 MMOL/L
CREAT SERPL-MCNC: 0.74 MG/DL
EGFR: 89 ML/MIN/1.73M2
EOSINOPHIL # BLD AUTO: 0.1 K/UL
EOSINOPHIL NFR BLD AUTO: 1.7 %
GLUCOSE SERPL-MCNC: 127 MG/DL
HCT VFR BLD CALC: 42.8 %
HGB BLD-MCNC: 13.7 G/DL
IMM GRANULOCYTES NFR BLD AUTO: 0.3 %
INR PPP: 1 RATIO
LYMPHOCYTES # BLD AUTO: 1.9 K/UL
LYMPHOCYTES NFR BLD AUTO: 32.8 %
MAN DIFF?: NORMAL
MCHC RBC-ENTMCNC: 29.1 PG
MCHC RBC-ENTMCNC: 32 GM/DL
MCV RBC AUTO: 91.1 FL
MONOCYTES # BLD AUTO: 0.48 K/UL
MONOCYTES NFR BLD AUTO: 8.3 %
NEUTROPHILS # BLD AUTO: 3.22 K/UL
NEUTROPHILS NFR BLD AUTO: 55.5 %
PLATELET # BLD AUTO: 297 K/UL
POTASSIUM SERPL-SCNC: 4.2 MMOL/L
PROT SERPL-MCNC: 7.4 G/DL
PT BLD: 11.6 SEC
RBC # BLD: 4.7 M/UL
RBC # FLD: 13.3 %
SODIUM SERPL-SCNC: 139 MMOL/L
WBC # FLD AUTO: 5.8 K/UL

## 2022-08-23 ENCOUNTER — APPOINTMENT (OUTPATIENT)
Dept: INTERNAL MEDICINE | Facility: CLINIC | Age: 67
End: 2022-08-23

## 2022-08-23 ENCOUNTER — NON-APPOINTMENT (OUTPATIENT)
Age: 67
End: 2022-08-23

## 2022-08-23 VITALS
WEIGHT: 102 LBS | OXYGEN SATURATION: 95 % | BODY MASS INDEX: 17.42 KG/M2 | DIASTOLIC BLOOD PRESSURE: 80 MMHG | HEIGHT: 64 IN | SYSTOLIC BLOOD PRESSURE: 140 MMHG | RESPIRATION RATE: 18 BRPM | HEART RATE: 91 BPM | TEMPERATURE: 99.3 F

## 2022-08-23 DIAGNOSIS — R05.3 CHRONIC COUGH: ICD-10-CM

## 2022-08-23 DIAGNOSIS — Z01.818 ENCOUNTER FOR OTHER PREPROCEDURAL EXAMINATION: ICD-10-CM

## 2022-08-23 PROCEDURE — 99213 OFFICE O/P EST LOW 20 MIN: CPT

## 2022-08-23 NOTE — PLAN
[FreeTextEntry1] : mildly elevated PTT without symptoms of easy bruising or bleeding is NOT a contraindication to proceed w/ bronchoscopy

## 2022-08-23 NOTE — RESULTS/DATA
[] : results reviewed [de-identified] : NL [de-identified] : slightly elevated PTT [de-identified] : NL [de-identified] : CT CHest c/w bronchiectasis [de-identified] : NSR no acute changes

## 2022-08-23 NOTE — HISTORY OF PRESENT ILLNESS
[No Pertinent Cardiac History] : no history of aortic stenosis, atrial fibrillation, coronary artery disease, recent myocardial infarction, or implantable device/pacemaker [COPD] : COPD [(Patient denies any chest pain, claudication, dyspnea on exertion, orthopnea, palpitations or syncope)] : Patient denies any chest pain, claudication, dyspnea on exertion, orthopnea, palpitations or syncope [Aortic Stenosis] : no aortic stenosis [Atrial Fibrillation] : no atrial fibrillation [Coronary Artery Disease] : no coronary artery disease [Recent Myocardial Infarction] : no recent myocardial infarction [Implantable Device/Pacemaker] : no implantable device/pacemaker [Family Member] : no family member with adverse anesthesia reaction/sudden death [Self] : no previous adverse anesthesia reaction [Chronic Anticoagulation] : no chronic anticoagulation [Chronic Kidney Disease] : no chronic kidney disease [Diabetes] : no diabetes [FreeTextEntry1] : bronchoscopy possible biopsy and or thoracentesis [FreeTextEntry2] : 8/26/2022 [FreeTextEntry3] : Dr Jen Kidd

## 2022-08-25 LAB — SARS-COV-2 N GENE NPH QL NAA+PROBE: NOT DETECTED

## 2022-08-26 ENCOUNTER — RESULT REVIEW (OUTPATIENT)
Age: 67
End: 2022-08-26

## 2022-08-26 ENCOUNTER — OUTPATIENT (OUTPATIENT)
Dept: OUTPATIENT SERVICES | Facility: HOSPITAL | Age: 67
LOS: 1 days | End: 2022-08-26
Payer: MEDICARE

## 2022-08-26 ENCOUNTER — TRANSCRIPTION ENCOUNTER (OUTPATIENT)
Age: 67
End: 2022-08-26

## 2022-08-26 ENCOUNTER — APPOINTMENT (OUTPATIENT)
Dept: PULMONOLOGY | Facility: HOSPITAL | Age: 67
End: 2022-08-26

## 2022-08-26 VITALS
RESPIRATION RATE: 20 BRPM | SYSTOLIC BLOOD PRESSURE: 153 MMHG | DIASTOLIC BLOOD PRESSURE: 62 MMHG | HEART RATE: 82 BPM | OXYGEN SATURATION: 97 %

## 2022-08-26 VITALS
SYSTOLIC BLOOD PRESSURE: 141 MMHG | RESPIRATION RATE: 15 BRPM | DIASTOLIC BLOOD PRESSURE: 83 MMHG | WEIGHT: 110.89 LBS | HEART RATE: 78 BPM | HEIGHT: 55 IN | OXYGEN SATURATION: 100 % | TEMPERATURE: 98 F

## 2022-08-26 DIAGNOSIS — Z98.890 OTHER SPECIFIED POSTPROCEDURAL STATES: Chronic | ICD-10-CM

## 2022-08-26 DIAGNOSIS — J47.9 BRONCHIECTASIS, UNCOMPLICATED: ICD-10-CM

## 2022-08-26 DIAGNOSIS — Z98.49 CATARACT EXTRACTION STATUS, UNSPECIFIED EYE: Chronic | ICD-10-CM

## 2022-08-26 LAB
B PERT IGG+IGM PNL SER: ABNORMAL
COLOR FLD: SIGNIFICANT CHANGE UP
FLUID INTAKE SUBSTANCE CLASS: SIGNIFICANT CHANGE UP
GRAM STN FLD: SIGNIFICANT CHANGE UP
LYMPHOCYTES # FLD: 5 % — SIGNIFICANT CHANGE UP
MONOS+MACROS # FLD: 13 % — SIGNIFICANT CHANGE UP
NEUTROPHILS-BODY FLUID: 82 % — SIGNIFICANT CHANGE UP
NIGHT BLUE STAIN TISS: SIGNIFICANT CHANGE UP
RCV VOL RI: 287 /UL — HIGH (ref 0–0)
SPECIMEN SOURCE: SIGNIFICANT CHANGE UP
SPECIMEN SOURCE: SIGNIFICANT CHANGE UP
TOTAL NUCLEATED CELL COUNT, BODY FLUID: 670 /UL — SIGNIFICANT CHANGE UP
TUBE TYPE: SIGNIFICANT CHANGE UP

## 2022-08-26 PROCEDURE — 87102 FUNGUS ISOLATION CULTURE: CPT

## 2022-08-26 PROCEDURE — 31624 DX BRONCHOSCOPE/LAVAGE: CPT

## 2022-08-26 PROCEDURE — 88112 CYTOPATH CELL ENHANCE TECH: CPT | Mod: 26

## 2022-08-26 PROCEDURE — 88112 CYTOPATH CELL ENHANCE TECH: CPT

## 2022-08-26 PROCEDURE — 87206 SMEAR FLUORESCENT/ACID STAI: CPT

## 2022-08-26 PROCEDURE — 88305 TISSUE EXAM BY PATHOLOGIST: CPT | Mod: 26

## 2022-08-26 PROCEDURE — 88305 TISSUE EXAM BY PATHOLOGIST: CPT

## 2022-08-26 PROCEDURE — 87116 MYCOBACTERIA CULTURE: CPT

## 2022-08-26 PROCEDURE — 87070 CULTURE OTHR SPECIMN AEROBIC: CPT

## 2022-08-26 PROCEDURE — 89051 BODY FLUID CELL COUNT: CPT

## 2022-08-26 PROCEDURE — 87015 SPECIMEN INFECT AGNT CONCNTJ: CPT

## 2022-08-26 DEVICE — PACK THORACENTESIS CHG: Type: IMPLANTABLE DEVICE | Status: FUNCTIONAL

## 2022-08-26 RX ORDER — ACETAMINOPHEN 500 MG
2 TABLET ORAL
Qty: 0 | Refills: 0 | DISCHARGE

## 2022-08-26 RX ORDER — SUCRALFATE 1 G
1 TABLET ORAL
Qty: 0 | Refills: 0 | DISCHARGE

## 2022-08-26 RX ORDER — SODIUM CHLORIDE 9 MG/ML
500 INJECTION INTRAMUSCULAR; INTRAVENOUS; SUBCUTANEOUS
Refills: 0 | Status: DISCONTINUED | OUTPATIENT
Start: 2022-08-26 | End: 2022-09-09

## 2022-08-26 RX ADMIN — SODIUM CHLORIDE 30 MILLILITER(S): 9 INJECTION INTRAMUSCULAR; INTRAVENOUS; SUBCUTANEOUS at 08:07

## 2022-08-26 NOTE — ASU PATIENT PROFILE, ADULT - FALL HARM RISK - UNIVERSAL INTERVENTIONS
Bed in lowest position, wheels locked, appropriate side rails in place/Call bell, personal items and telephone in reach/Instruct patient to call for assistance before getting out of bed or chair/Non-slip footwear when patient is out of bed/[facility] to call system/Physically safe environment - no spills, clutter or unnecessary equipment/Purposeful Proactive Rounding/Room/bathroom lighting operational, light cord in reach

## 2022-08-26 NOTE — PRE PROCEDURE NOTE - PRE PROCEDURE EVALUATION
Attending Physician:         Dr. Kidd                   Procedure: bronchoscopy    Indication for Procedure: abnormal chest ct , mucous plugging  ________________________________________________________  PAST MEDICAL & SURGICAL HISTORY:  Acid reflux      S/P cataract surgery  Feb 2021      S/P breast biopsy  2020        ALLERGIES:  penicillin (Nausea)    HOME MEDICATIONS:  Multiple Vitamins oral capsule: 1 cap(s) orally once a day  sucralfate 1 g oral tablet: 1 tab(s) orally 4 times a day (before meals and at bedtime)  Tylenol 500 mg oral tablet: 2 tab(s) orally every 6 hours as needed for mild pain    AICD/PPM: [ ] yes   [ ] no    PERTINENT LAB DATA:      see allscripts, reviewed      PHYSICAL EXAMINATION:    Height (cm): 162.6  Weight (kg): 50.3  BMI (kg/m2): 19  BSA (m2): 1.52T(C): 36.5  HR: 78  BP: 141/83  RR: 15  SpO2: 100%    Constitutional: NAD  HEENT: PERRLA, EOMI,    Neck:  No JVD  Respiratory: CTAB/L  Cardiovascular: S1 and S2  Gastrointestinal: BS+, soft, NT/ND  Extremities: No peripheral edema  Neurological: A/O x 3, no focal deficits  Psychiatric: Normal mood, normal affect  Skin: No rashes    ASA Class: I [ ]  II [ ]  III [ ]  IV [ ]    COMMENTS:    The patient is a suitable candidate for the planned procedure unless box checked [ ]  No, explain:

## 2022-08-26 NOTE — ASU DISCHARGE PLAN (ADULT/PEDIATRIC) - ASU DC SPECIAL INSTRUCTIONSFT
Follow up with  in 3 weeks. Follow up with  in 3 weeks.    Bronchoscopy Unit Discharge Instructions:    The following information has been prepared for you to help you recover after having a bronchoscopy procedure.    You may have mild throat discomfort and you may cough up small amounts of blood. This is normal, and should go away in a day or two. You should rest at home today. Wait at least 2 hours after the procedure to have a light meal.    Call Your Doctor if:    • You become *short of breath (*= emergency)  • You have *chest pain (*= emergency)  • Your IV site becomes red, swollen or painful    Do Not:    • Take any sedatives or narcotics in the next 24 hours unless your doctor tells you it is okay.    Aftercare:    • If tests were done during the bronchoscopy, call your doctor’s office in a few days to get the results.

## 2022-08-26 NOTE — ASU DISCHARGE PLAN (ADULT/PEDIATRIC) - ASU DISCHARGE DATE/TIME
26-Aug-2022 09:13 Azithromycin Counseling:  I discussed with the patient the risks of azithromycin including but not limited to GI upset, allergic reaction, drug rash, diarrhea, and yeast infections.

## 2022-08-26 NOTE — ASU PATIENT PROFILE, ADULT - FALL HARM RISK - PATIENT NEEDS ASSISTANCE
If she wants to check the blood test the urine test isn't necessary. Assume for missed menses?
Patient is requesting orders for urine and blood test to check for pregnancy. Please call and advise.
Spoke to pt she had spoke to her ob and didn't need to address this anynmore.
No assistance needed

## 2022-08-26 NOTE — ASU DISCHARGE PLAN (ADULT/PEDIATRIC) - NS MD DC FALL RISK RISK
For information on Fall & Injury Prevention, visit: [email or web address] OR  [email or web address] OR  https://www.cdc.gov/steadi/patient.html

## 2022-08-28 LAB
CULTURE RESULTS: SIGNIFICANT CHANGE UP
SPECIMEN SOURCE: SIGNIFICANT CHANGE UP

## 2022-09-21 LAB
CULTURE RESULTS: SIGNIFICANT CHANGE UP
SPECIMEN SOURCE: SIGNIFICANT CHANGE UP

## 2022-09-26 ENCOUNTER — APPOINTMENT (OUTPATIENT)
Dept: PULMONOLOGY | Facility: CLINIC | Age: 67
End: 2022-09-26

## 2022-09-26 VITALS
WEIGHT: 102 LBS | BODY MASS INDEX: 17.42 KG/M2 | HEART RATE: 94 BPM | SYSTOLIC BLOOD PRESSURE: 141 MMHG | HEIGHT: 64 IN | DIASTOLIC BLOOD PRESSURE: 90 MMHG | OXYGEN SATURATION: 98 %

## 2022-09-26 DIAGNOSIS — J47.9 BRONCHIECTASIS, UNCOMPLICATED: ICD-10-CM

## 2022-09-26 PROCEDURE — 99214 OFFICE O/P EST MOD 30 MIN: CPT

## 2022-09-26 NOTE — ASSESSMENT
[FreeTextEntry1] : repeat ct end of october to evaluate if we were successful in opening up RML after bronch\par cont airway clearance.\par \par Total encounter time 30 minutes.\par

## 2022-09-26 NOTE — PROCEDURE
[FreeTextEntry1] : bronch results: no malignant cells, neg cultures.\par \par \par Reviewed:\par ACC: [id number] EXAM: CT CHEST\par \par PROCEDURE DATE: 08/08/2022\par \par \par \par INTERPRETATION: INDICATION: follow up bronchiectasis\par TECHNIQUE: Unenhanced CT of the chest. Coronal, sagittal, and MIP images were reconstructed and reviewed.\par COMPARISON: 9/7/2021 chest CT.\par \par FINDINGS:\par \par AIRWAYS, LUNGS and PLEURA: Stable mucoid impactions, bronchiectasis and partial opacification of the right middle lobe. Mild mucoid impaction of anterior right upper lobe is unchanged. The lungs are otherwise clear. No pleural effusion.\par \par MEDIASTINUM AND MASSIEL: No lymphadenopathy.\par \par HEART AND VESSELS: Heart size is normal. No pericardial effusion. Thoracic aorta and pulmonary artery normal in diameter. Mild coronary and aortic calcifications.\par \par VISUALIZED UPPER ABDOMEN: Within normal limits.\par \par CHEST WALL AND BONES: No aggressive osseous lesion.\par \par LOWER NECK: Within normal limits.\par \par IMPRESSION:\par \par Stable mucoid impactions, bronchiectasis and partial opacification of the right middle lobe.\par \par --- End of Report ---\par \par \par \par \par \par JUDAH GRAVES MD; Attending Radiologist\par This document has been electronically signed. Aug 10 2022 3:34PM\par \par \par \par Prior exams reviewed:\par \par PFT: Mild obstruction.  Lung volumes normal. DLCO mildly reduced.\par \par \par EXAM: CT CHEST\par \par \par PROCEDURE DATE: 09/07/2021\par \par \par \par INTERPRETATION: .\par ACC: [id number]\par INDICATION: Bronchiectasis\par TECHNIQUE: Unenhanced CT of the chest. Coronal and sagittal images were reconstructed. Maximum intensity projection images were generated.\par COMPARISON: 7/14/2021\par \par FINDINGS:\par \par AIRWAYS, LUNGS and PLEURA: Patent central airways. Bronchiectasis and mucoid impactions within the right upper lobe and right middle lobe are decreased. Improved aeration of the right middle lobe. The lungs are otherwise clear. No pleural effusion.\par \par MEDIASTINUM AND MASSIEL: No lymphadenopathy.\par \par HEART AND VESSELS: Heart size is normal. Mild aortic and coronary calcification. No pericardial effusion. Thoracic aorta and pulmonary artery normal in diameter.\par \par VISUALIZED UPPER ABDOMEN: Within normal limits.\par \par CHEST WALL AND BONES: No aggressive osseous lesion. Pectus carinatum.\par \par LOWER NECK: Within normal limits.\par \par IMPRESSION:\par \par Mucoid impactions within the anterior right upper lobe and right middle lobe are slightly decreased and there is improved aeration of the right middle lobe.\par \par --- End of Report ---\par \par \par JUDAH GRAVES MD; Attending Radiologist\par This document has been electronically signed. Sep 8 2021 4:04PM\par \par \par \par \par Reviewed:\par EXAM: CT CHEST\par \par \par PROCEDURE DATE: 07/14/2021\par \par \par \par INTERPRETATION: CLINICAL INDICATION: Chronic cough, hiatal hernia, status post fundoplication in May 2021.\par \par Axial CT images of the chest are obtained without intravenous administration of contrast.\par \par No prior chest CTs are available for comparison.\par \par No enlarged axillary, mediastinal or hilar lymph nodes. 7 mm peripherally calcified nodule within the right lobe of the thyroid gland.\par \par Heart size is normal. No pericardial effusion. Coronary artery calcification within the left anterior descending artery. Vascular calcifications with involvement of the aorta.\par \par No pleural effusions.\par \par Evaluation of the upper abdomen demonstrate postoperative changes in the region of the GE junction related to the given history of fundoplication. Mild to moderate diffuse dilatation of the esophagus containing some internal ingested material.\par \par Evaluation of the lungs demonstrates 2 mm left upper lobe calcified granuloma.\par \par 3 mm left upper lobe pulmonary nodule on image 52 of series 3.\par \par Complete opacification of the right middle lobe with some associated volume loss and internal air bronchograms suggestive of atelectasis, may be on a chronic basis as correlated with the prior chest x-rays dating back to November 14, 2019.\par There is an adjacent and inseparable rounded opacity or consolidation inferior to the right middle lobe, possibly within the adjacent anterior segment of the right lower lobe measuring about 1.8 cm.\par \par Linear subsegmental atelectasis within the lower portions of the right upper lobe adjacent to the anterior mediastinum.\par \par The right middle lobe bronchus appears patent. Some opacification of the lateral segmental bronchus of the right middle lobe may be due to internal secretions.\par \par Degenerative changes of the spine.\par \par IMPRESSION: Complete opacification of the right middle lobe with some associated volume loss suggestive of atelectasis may be on a chronic basis as correlated with the prior chest x-rays.\par \par An inseparable rounded opacity inferior to the right middle lobe possibly within the adjacent right lower lobe may represent an acute infection.\par \par 1-3 month follow-up noncontrast chest CT is recommended for further evaluation.\par \par Mild-to-moderate diffuse dilatation of the esophagus.\par \par --- End of Report ---\par Prior exams reviewed:\par \par CXR: clear lungs, no focal consolidation or pleural effusions, cardiac silhouette appears normal. kyphosis\par \par PFT: Normal spirometry.  Lung volumes normal. DLCO normal.\par \par

## 2022-09-26 NOTE — HISTORY OF PRESENT ILLNESS
[Never] : never [TextBox_4] : doing well post bronch\par no malignant cells\par nothing on culture\par doing airway clearance

## 2022-10-15 LAB
CULTURE RESULTS: SIGNIFICANT CHANGE UP
SPECIMEN SOURCE: SIGNIFICANT CHANGE UP

## 2022-10-21 ENCOUNTER — APPOINTMENT (OUTPATIENT)
Dept: CT IMAGING | Facility: HOSPITAL | Age: 67
End: 2022-10-21

## 2022-10-21 ENCOUNTER — OUTPATIENT (OUTPATIENT)
Dept: OUTPATIENT SERVICES | Facility: HOSPITAL | Age: 67
LOS: 1 days | End: 2022-10-21
Payer: MEDICARE

## 2022-10-21 DIAGNOSIS — Z98.49 CATARACT EXTRACTION STATUS, UNSPECIFIED EYE: Chronic | ICD-10-CM

## 2022-10-21 DIAGNOSIS — J47.9 BRONCHIECTASIS, UNCOMPLICATED: ICD-10-CM

## 2022-10-21 DIAGNOSIS — Z98.890 OTHER SPECIFIED POSTPROCEDURAL STATES: Chronic | ICD-10-CM

## 2022-10-21 PROCEDURE — 71250 CT THORAX DX C-: CPT

## 2022-10-21 PROCEDURE — 71250 CT THORAX DX C-: CPT | Mod: 26,MH

## 2022-11-11 ENCOUNTER — APPOINTMENT (OUTPATIENT)
Dept: PULMONOLOGY | Facility: CLINIC | Age: 67
End: 2022-11-11

## 2022-11-11 ENCOUNTER — NON-APPOINTMENT (OUTPATIENT)
Age: 67
End: 2022-11-11

## 2022-11-11 VITALS
SYSTOLIC BLOOD PRESSURE: 154 MMHG | HEART RATE: 105 BPM | OXYGEN SATURATION: 96 % | DIASTOLIC BLOOD PRESSURE: 82 MMHG | BODY MASS INDEX: 18.78 KG/M2 | TEMPERATURE: 100.6 F | HEIGHT: 64 IN | WEIGHT: 110 LBS

## 2022-11-11 PROCEDURE — 99214 OFFICE O/P EST MOD 30 MIN: CPT

## 2022-11-11 RX ORDER — LEVOFLOXACIN 750 MG/1
750 TABLET, FILM COATED ORAL DAILY
Qty: 7 | Refills: 0 | Status: ACTIVE | COMMUNITY
Start: 2022-11-11 | End: 1900-01-01

## 2022-11-11 NOTE — ASSESSMENT
[FreeTextEntry1] : ct unchanged, consider repeat 1 year\par check RVP\par increase nebs to TID\par start levaquin

## 2022-11-11 NOTE — PROCEDURE
[FreeTextEntry1] : \par ACC: [id number] EXAM: CT CHEST\par \par PROCEDURE DATE: 10/21/2022\par \par \par \par INTERPRETATION: Reason for Exam: Follow-up post bronchoscopy\par \par CT of the chest was performed from the thoracic inlet to the level of the adrenal glands without contrast injection.\par \par Comparison: August 8, 2022\par \par Tubes/Lines: None.\par \par Mediastinum/Vessels/Heart: Aorta and pulmonary arteries are normal in size. There is no pericardial effusion. No lymphadenopathy. Thyroid gland is unremarkable\par \par Lungs/Pleura/Airways: Unchanged partial right middle lobe atelectasis with areas of bronchiectasis and mucoid impaction. A right lower lobe 5 mm nodule on series 3 image 98 is unchanged.\par \par Visualized abdomen: Unremarkable noncontrast appearance of the upper abdomen\par \par Bones and soft tissues: No suspicious osseous lesions. Degenerative changes noted throughout the spine.\par \par IMPRESSION:\par \par When compared with the prior study of August 8, 2022, there is no significant change. Specifically focal area of atelectasis the right middle lobe with associated bronchiectasis and mucoid impaction as well as right lower lobe 5 mm nodule are stable. No new consolidations, edema, effusion or pneumothorax.\par \par --- End of Report ---\par \par \par \par \par bronch results: no malignant cells, neg cultures.\par \par \par Reviewed:\par ACC: [id number] EXAM: CT CHEST\par \par PROCEDURE DATE: 08/08/2022\par \par \par \par INTERPRETATION: INDICATION: follow up bronchiectasis\par TECHNIQUE: Unenhanced CT of the chest. Coronal, sagittal, and MIP images were reconstructed and reviewed.\par COMPARISON: 9/7/2021 chest CT.\par \par FINDINGS:\par \par AIRWAYS, LUNGS and PLEURA: Stable mucoid impactions, bronchiectasis and partial opacification of the right middle lobe. Mild mucoid impaction of anterior right upper lobe is unchanged. The lungs are otherwise clear. No pleural effusion.\par \par MEDIASTINUM AND MASSIEL: No lymphadenopathy.\par \par HEART AND VESSELS: Heart size is normal. No pericardial effusion. Thoracic aorta and pulmonary artery normal in diameter. Mild coronary and aortic calcifications.\par \par VISUALIZED UPPER ABDOMEN: Within normal limits.\par \par CHEST WALL AND BONES: No aggressive osseous lesion.\par \par LOWER NECK: Within normal limits.\par \par IMPRESSION:\par \par Stable mucoid impactions, bronchiectasis and partial opacification of the right middle lobe.\par \par --- End of Report ---\par \par \par \par \par \par JUDAH GRAVES MD; Attending Radiologist\par This document has been electronically signed. Aug 10 2022 3:34PM\par \par \par \par Prior exams reviewed:\par \par PFT: Mild obstruction.  Lung volumes normal. DLCO mildly reduced.\par \par \par EXAM: CT CHEST\par \par \par PROCEDURE DATE: 09/07/2021\par \par \par \par INTERPRETATION: .\par ACC: [id number]\par INDICATION: Bronchiectasis\par TECHNIQUE: Unenhanced CT of the chest. Coronal and sagittal images were reconstructed. Maximum intensity projection images were generated.\par COMPARISON: 7/14/2021\par \par FINDINGS:\par \par AIRWAYS, LUNGS and PLEURA: Patent central airways. Bronchiectasis and mucoid impactions within the right upper lobe and right middle lobe are decreased. Improved aeration of the right middle lobe. The lungs are otherwise clear. No pleural effusion.\par \par MEDIASTINUM AND MASSIEL: No lymphadenopathy.\par \par HEART AND VESSELS: Heart size is normal. Mild aortic and coronary calcification. No pericardial effusion. Thoracic aorta and pulmonary artery normal in diameter.\par \par VISUALIZED UPPER ABDOMEN: Within normal limits.\par \par CHEST WALL AND BONES: No aggressive osseous lesion. Pectus carinatum.\par \par LOWER NECK: Within normal limits.\par \par IMPRESSION:\par \par Mucoid impactions within the anterior right upper lobe and right middle lobe are slightly decreased and there is improved aeration of the right middle lobe.\par \par --- End of Report ---\par \par \par JUDAH GRAVES MD; Attending Radiologist\par This document has been electronically signed. Sep 8 2021 4:04PM\par \par \par \par \par Reviewed:\par EXAM: CT CHEST\par \par \par PROCEDURE DATE: 07/14/2021\par \par \par \par INTERPRETATION: CLINICAL INDICATION: Chronic cough, hiatal hernia, status post fundoplication in May 2021.\par \par Axial CT images of the chest are obtained without intravenous administration of contrast.\par \par No prior chest CTs are available for comparison.\par \par No enlarged axillary, mediastinal or hilar lymph nodes. 7 mm peripherally calcified nodule within the right lobe of the thyroid gland.\par \par Heart size is normal. No pericardial effusion. Coronary artery calcification within the left anterior descending artery. Vascular calcifications with involvement of the aorta.\par \par No pleural effusions.\par \par Evaluation of the upper abdomen demonstrate postoperative changes in the region of the GE junction related to the given history of fundoplication. Mild to moderate diffuse dilatation of the esophagus containing some internal ingested material.\par \par Evaluation of the lungs demonstrates 2 mm left upper lobe calcified granuloma.\par \par 3 mm left upper lobe pulmonary nodule on image 52 of series 3.\par \par Complete opacification of the right middle lobe with some associated volume loss and internal air bronchograms suggestive of atelectasis, may be on a chronic basis as correlated with the prior chest x-rays dating back to November 14, 2019.\par There is an adjacent and inseparable rounded opacity or consolidation inferior to the right middle lobe, possibly within the adjacent anterior segment of the right lower lobe measuring about 1.8 cm.\par \par Linear subsegmental atelectasis within the lower portions of the right upper lobe adjacent to the anterior mediastinum.\par \par The right middle lobe bronchus appears patent. Some opacification of the lateral segmental bronchus of the right middle lobe may be due to internal secretions.\par \par Degenerative changes of the spine.\par \par IMPRESSION: Complete opacification of the right middle lobe with some associated volume loss suggestive of atelectasis may be on a chronic basis as correlated with the prior chest x-rays.\par \par An inseparable rounded opacity inferior to the right middle lobe possibly within the adjacent right lower lobe may represent an acute infection.\par \par 1-3 month follow-up noncontrast chest CT is recommended for further evaluation.\par \par Mild-to-moderate diffuse dilatation of the esophagus.\par \par --- End of Report ---\par Prior exams reviewed:\par \par CXR: clear lungs, no focal consolidation or pleural effusions, cardiac silhouette appears normal. kyphosis\par \par PFT: Normal spirometry.  Lung volumes normal. DLCO normal.\par \par

## 2022-11-11 NOTE — HISTORY OF PRESENT ILLNESS
[Never] : never [TextBox_4] : here to fu repeat ct chest which was unchanged post bronch\par \par grandkids have been sick\par feeling feverish this am with chills\par temp 100.6 currently\par increase cough/sputum

## 2022-11-12 ENCOUNTER — NON-APPOINTMENT (OUTPATIENT)
Age: 67
End: 2022-11-12

## 2022-11-12 DIAGNOSIS — U07.1 COVID-19: Chronic | ICD-10-CM

## 2022-11-14 LAB
RAPID RVP RESULT: DETECTED
SARS-COV-2 RNA PNL RESP NAA+PROBE: DETECTED

## 2022-11-28 ENCOUNTER — APPOINTMENT (OUTPATIENT)
Dept: PULMONOLOGY | Facility: CLINIC | Age: 67
End: 2022-11-28

## 2022-12-16 ENCOUNTER — APPOINTMENT (OUTPATIENT)
Dept: PULMONOLOGY | Facility: CLINIC | Age: 67
End: 2022-12-16

## 2022-12-16 VITALS
HEIGHT: 64 IN | DIASTOLIC BLOOD PRESSURE: 95 MMHG | BODY MASS INDEX: 17.24 KG/M2 | OXYGEN SATURATION: 98 % | SYSTOLIC BLOOD PRESSURE: 153 MMHG | HEART RATE: 113 BPM | WEIGHT: 101 LBS

## 2022-12-16 DIAGNOSIS — J47.1 BRONCHIECTASIS WITH (ACUTE) EXACERBATION: ICD-10-CM

## 2022-12-16 PROCEDURE — 71046 X-RAY EXAM CHEST 2 VIEWS: CPT

## 2022-12-16 PROCEDURE — 99214 OFFICE O/P EST MOD 30 MIN: CPT | Mod: 25

## 2022-12-16 RX ORDER — ALBUTEROL SULFATE 2.5 MG/3ML
(2.5 MG/3ML) SOLUTION RESPIRATORY (INHALATION) 4 TIMES DAILY
Qty: 1 | Refills: 5 | Status: ACTIVE | COMMUNITY
Start: 2021-07-16 | End: 1900-01-01

## 2022-12-16 RX ORDER — PREDNISONE 10 MG/1
10 TABLET ORAL
Qty: 30 | Refills: 1 | Status: ACTIVE | COMMUNITY
Start: 2022-12-16 | End: 1900-01-01

## 2022-12-16 NOTE — PROCEDURE
[FreeTextEntry1] : \par CXR: clear lungs, no focal consolidation or pleural effusions, cardiac silhouette appears normal.  No bony abnormality.\par \par \par \par \par Prior exams reviewed:\par ACC: [id number] EXAM: CT CHEST\par \par PROCEDURE DATE: 10/21/2022\par \par \par \par INTERPRETATION: Reason for Exam: Follow-up post bronchoscopy\par \par CT of the chest was performed from the thoracic inlet to the level of the adrenal glands without contrast injection.\par \par Comparison: August 8, 2022\par \par Tubes/Lines: None.\par \par Mediastinum/Vessels/Heart: Aorta and pulmonary arteries are normal in size. There is no pericardial effusion. No lymphadenopathy. Thyroid gland is unremarkable\par \par Lungs/Pleura/Airways: Unchanged partial right middle lobe atelectasis with areas of bronchiectasis and mucoid impaction. A right lower lobe 5 mm nodule on series 3 image 98 is unchanged.\par \par Visualized abdomen: Unremarkable noncontrast appearance of the upper abdomen\par \par Bones and soft tissues: No suspicious osseous lesions. Degenerative changes noted throughout the spine.\par \par IMPRESSION:\par \par When compared with the prior study of August 8, 2022, there is no significant change. Specifically focal area of atelectasis the right middle lobe with associated bronchiectasis and mucoid impaction as well as right lower lobe 5 mm nodule are stable. No new consolidations, edema, effusion or pneumothorax.\par \par --- End of Report ---\par \par \par \par \par bronch results: no malignant cells, neg cultures.\par \par \par Reviewed:\par ACC: [id number] EXAM: CT CHEST\par \par PROCEDURE DATE: 08/08/2022\par \par \par \par INTERPRETATION: INDICATION: follow up bronchiectasis\par TECHNIQUE: Unenhanced CT of the chest. Coronal, sagittal, and MIP images were reconstructed and reviewed.\par COMPARISON: 9/7/2021 chest CT.\par \par FINDINGS:\par \par AIRWAYS, LUNGS and PLEURA: Stable mucoid impactions, bronchiectasis and partial opacification of the right middle lobe. Mild mucoid impaction of anterior right upper lobe is unchanged. The lungs are otherwise clear. No pleural effusion.\par \par MEDIASTINUM AND MASSIEL: No lymphadenopathy.\par \par HEART AND VESSELS: Heart size is normal. No pericardial effusion. Thoracic aorta and pulmonary artery normal in diameter. Mild coronary and aortic calcifications.\par \par VISUALIZED UPPER ABDOMEN: Within normal limits.\par \par CHEST WALL AND BONES: No aggressive osseous lesion.\par \par LOWER NECK: Within normal limits.\par \par IMPRESSION:\par \par Stable mucoid impactions, bronchiectasis and partial opacification of the right middle lobe.\par \par --- End of Report ---\par \par \par \par \par \par JUDAH GRAVES MD; Attending Radiologist\par This document has been electronically signed. Aug 10 2022 3:34PM\par \par \par \par Prior exams reviewed:\par \par PFT: Mild obstruction.  Lung volumes normal. DLCO mildly reduced.\par \par \par EXAM: CT CHEST\par \par \par PROCEDURE DATE: 09/07/2021\par \par \par \par INTERPRETATION: .\par ACC: [id number]\par INDICATION: Bronchiectasis\par TECHNIQUE: Unenhanced CT of the chest. Coronal and sagittal images were reconstructed. Maximum intensity projection images were generated.\par COMPARISON: 7/14/2021\par \par FINDINGS:\par \par AIRWAYS, LUNGS and PLEURA: Patent central airways. Bronchiectasis and mucoid impactions within the right upper lobe and right middle lobe are decreased. Improved aeration of the right middle lobe. The lungs are otherwise clear. No pleural effusion.\par \par MEDIASTINUM AND MASSIEL: No lymphadenopathy.\par \par HEART AND VESSELS: Heart size is normal. Mild aortic and coronary calcification. No pericardial effusion. Thoracic aorta and pulmonary artery normal in diameter.\par \par VISUALIZED UPPER ABDOMEN: Within normal limits.\par \par CHEST WALL AND BONES: No aggressive osseous lesion. Pectus carinatum.\par \par LOWER NECK: Within normal limits.\par \par IMPRESSION:\par \par Mucoid impactions within the anterior right upper lobe and right middle lobe are slightly decreased and there is improved aeration of the right middle lobe.\par \par --- End of Report ---\par \par \par JUDAH GRAVES MD; Attending Radiologist\par This document has been electronically signed. Sep 8 2021 4:04PM\par \par \par \par \par Reviewed:\par EXAM: CT CHEST\par \par \par PROCEDURE DATE: 07/14/2021\par \par \par \par INTERPRETATION: CLINICAL INDICATION: Chronic cough, hiatal hernia, status post fundoplication in May 2021.\par \par Axial CT images of the chest are obtained without intravenous administration of contrast.\par \par No prior chest CTs are available for comparison.\par \par No enlarged axillary, mediastinal or hilar lymph nodes. 7 mm peripherally calcified nodule within the right lobe of the thyroid gland.\par \par Heart size is normal. No pericardial effusion. Coronary artery calcification within the left anterior descending artery. Vascular calcifications with involvement of the aorta.\par \par No pleural effusions.\par \par Evaluation of the upper abdomen demonstrate postoperative changes in the region of the GE junction related to the given history of fundoplication. Mild to moderate diffuse dilatation of the esophagus containing some internal ingested material.\par \par Evaluation of the lungs demonstrates 2 mm left upper lobe calcified granuloma.\par \par 3 mm left upper lobe pulmonary nodule on image 52 of series 3.\par \par Complete opacification of the right middle lobe with some associated volume loss and internal air bronchograms suggestive of atelectasis, may be on a chronic basis as correlated with the prior chest x-rays dating back to November 14, 2019.\par There is an adjacent and inseparable rounded opacity or consolidation inferior to the right middle lobe, possibly within the adjacent anterior segment of the right lower lobe measuring about 1.8 cm.\par \par Linear subsegmental atelectasis within the lower portions of the right upper lobe adjacent to the anterior mediastinum.\par \par The right middle lobe bronchus appears patent. Some opacification of the lateral segmental bronchus of the right middle lobe may be due to internal secretions.\par \par Degenerative changes of the spine.\par \par IMPRESSION: Complete opacification of the right middle lobe with some associated volume loss suggestive of atelectasis may be on a chronic basis as correlated with the prior chest x-rays.\par \par An inseparable rounded opacity inferior to the right middle lobe possibly within the adjacent right lower lobe may represent an acute infection.\par \par 1-3 month follow-up noncontrast chest CT is recommended for further evaluation.\par \par Mild-to-moderate diffuse dilatation of the esophagus.\par \par --- End of Report ---\par Prior exams reviewed:\par \par CXR: clear lungs, no focal consolidation or pleural effusions, cardiac silhouette appears normal. kyphosis\par \par PFT: Normal spirometry.  Lung volumes normal. DLCO normal.\par \par

## 2022-12-16 NOTE — HISTORY OF PRESENT ILLNESS
[Never] : never [TextBox_4] : diagnosed with covid at last visit so didn't take levaquin, took paxlovid\par recovered\par then grandson coughed on her last weekend \par started not feeling well, cough/congestion\par started taking levaquin for past 6 days\par still not feeling great\par cant take nebs bc was making her gag

## 2023-02-09 ENCOUNTER — NON-APPOINTMENT (OUTPATIENT)
Age: 68
End: 2023-02-09

## 2023-02-09 DIAGNOSIS — Z98.890 OTHER SPECIFIED POSTPROCEDURAL STATES: ICD-10-CM

## 2023-02-09 DIAGNOSIS — Z78.9 OTHER SPECIFIED HEALTH STATUS: ICD-10-CM

## 2023-02-09 DIAGNOSIS — Z92.89 PERSONAL HISTORY OF OTHER MEDICAL TREATMENT: ICD-10-CM

## 2023-02-09 DIAGNOSIS — M85.80 OTHER SPECIFIED DISORDERS OF BONE DENSITY AND STRUCTURE, UNSPECIFIED SITE: ICD-10-CM

## 2023-02-09 DIAGNOSIS — M81.0 AGE-RELATED OSTEOPOROSIS W/OUT CURRENT PATHOLOGICAL FRACTURE: ICD-10-CM

## 2023-02-09 DIAGNOSIS — R92.2 INCONCLUSIVE MAMMOGRAM: ICD-10-CM

## 2023-02-09 DIAGNOSIS — N95.2 POSTMENOPAUSAL ATROPHIC VAGINITIS: ICD-10-CM

## 2025-01-31 ENCOUNTER — APPOINTMENT (OUTPATIENT)
Dept: ORTHOPEDIC SURGERY | Facility: CLINIC | Age: 70
End: 2025-01-31
Payer: MEDICARE

## 2025-01-31 VITALS — BODY MASS INDEX: 17.07 KG/M2 | WEIGHT: 100 LBS | HEIGHT: 64 IN

## 2025-01-31 DIAGNOSIS — S62.357A NONDISPLACED FRACTURE OF SHAFT OF FIFTH METACARPAL BONE, LEFT HAND, INITIAL ENCOUNTER FOR CLOSED FRACTURE: ICD-10-CM

## 2025-01-31 PROCEDURE — 26600 TREAT METACARPAL FRACTURE: CPT | Mod: LT

## 2025-01-31 PROCEDURE — 73130 X-RAY EXAM OF HAND: CPT | Mod: LT

## 2025-01-31 PROCEDURE — 99203 OFFICE O/P NEW LOW 30 MIN: CPT | Mod: 25

## 2025-01-31 PROCEDURE — L3908: CPT | Mod: LT

## 2025-02-21 ENCOUNTER — APPOINTMENT (OUTPATIENT)
Dept: ORTHOPEDIC SURGERY | Facility: CLINIC | Age: 70
End: 2025-02-21
Payer: MEDICARE

## 2025-02-21 VITALS — BODY MASS INDEX: 17.07 KG/M2 | WEIGHT: 100 LBS | HEIGHT: 64 IN

## 2025-02-21 DIAGNOSIS — S62.357D NONDISPLACED FRACTURE OF SHAFT OF FIFTH METACARPAL BONE, LEFT HAND, SUBSEQUENT ENCOUNTER FOR FRACTURE WITH ROUTINE HEALING: ICD-10-CM

## 2025-02-21 PROCEDURE — 99024 POSTOP FOLLOW-UP VISIT: CPT

## 2025-02-21 PROCEDURE — 73130 X-RAY EXAM OF HAND: CPT | Mod: LT

## 2025-03-06 NOTE — ASU PREOP CHECKLIST - HEIGHT IN FEET
Refill request for CITALOPRAM medication.     Name of Pharmacy- [facility]      Last visit - 1/16/25     Pending visit - 1/21/26    Last refill -11/26/24      Medication Contract signed -   Last Oarrs ran-         Additional Comments     
5

## 2025-03-28 ENCOUNTER — APPOINTMENT (OUTPATIENT)
Dept: ORTHOPEDIC SURGERY | Facility: CLINIC | Age: 70
End: 2025-03-28
Payer: MEDICARE

## 2025-03-28 VITALS — BODY MASS INDEX: 17.07 KG/M2 | HEIGHT: 64 IN | WEIGHT: 100 LBS

## 2025-03-28 DIAGNOSIS — S62.357D NONDISPLACED FRACTURE OF SHAFT OF FIFTH METACARPAL BONE, LEFT HAND, SUBSEQUENT ENCOUNTER FOR FRACTURE WITH ROUTINE HEALING: ICD-10-CM

## 2025-03-28 PROCEDURE — 99213 OFFICE O/P EST LOW 20 MIN: CPT | Mod: 24

## 2025-03-28 PROCEDURE — 73130 X-RAY EXAM OF HAND: CPT | Mod: LT

## 2025-09-09 ENCOUNTER — NON-APPOINTMENT (OUTPATIENT)
Age: 70
End: 2025-09-09

## 2025-09-11 ENCOUNTER — APPOINTMENT (OUTPATIENT)
Age: 70
End: 2025-09-11
Payer: MEDICARE

## 2025-09-11 VITALS
WEIGHT: 104 LBS | TEMPERATURE: 97.6 F | RESPIRATION RATE: 16 BRPM | HEIGHT: 64 IN | BODY MASS INDEX: 17.75 KG/M2 | HEART RATE: 99 BPM | SYSTOLIC BLOOD PRESSURE: 151 MMHG | OXYGEN SATURATION: 99 % | DIASTOLIC BLOOD PRESSURE: 105 MMHG

## 2025-09-11 DIAGNOSIS — R73.9 HYPERGLYCEMIA, UNSPECIFIED: ICD-10-CM

## 2025-09-11 DIAGNOSIS — J47.9 BRONCHIECTASIS, UNCOMPLICATED: ICD-10-CM

## 2025-09-11 DIAGNOSIS — K21.9 GASTRO-ESOPHAGEAL REFLUX DISEASE W/OUT ESOPHAGITIS: ICD-10-CM

## 2025-09-11 DIAGNOSIS — R03.0 ELEVATED BLOOD-PRESSURE READING, W/OUT DIAGNOSIS OF HYPERTENSION: ICD-10-CM

## 2025-09-11 DIAGNOSIS — E78.5 HYPERLIPIDEMIA, UNSPECIFIED: ICD-10-CM

## 2025-09-11 DIAGNOSIS — E55.9 VITAMIN D DEFICIENCY, UNSPECIFIED: ICD-10-CM

## 2025-09-11 DIAGNOSIS — E53.8 DEFICIENCY OF OTHER SPECIFIED B GROUP VITAMINS: ICD-10-CM

## 2025-09-11 DIAGNOSIS — M81.0 AGE-RELATED OSTEOPOROSIS W/OUT CURRENT PATHOLOGICAL FRACTURE: ICD-10-CM

## 2025-09-11 DIAGNOSIS — Z12.39 ENCOUNTER FOR OTHER SCREENING FOR MALIGNANT NEOPLASM OF BREAST: ICD-10-CM

## 2025-09-11 DIAGNOSIS — Z23 ENCOUNTER FOR IMMUNIZATION: ICD-10-CM

## 2025-09-11 PROCEDURE — G2211 COMPLEX E/M VISIT ADD ON: CPT

## 2025-09-11 PROCEDURE — 99205 OFFICE O/P NEW HI 60 MIN: CPT

## 2025-09-11 PROCEDURE — G0008: CPT

## 2025-09-11 PROCEDURE — 90662 IIV NO PRSV INCREASED AG IM: CPT

## 2025-09-11 PROCEDURE — 36415 COLL VENOUS BLD VENIPUNCTURE: CPT

## 2025-09-12 LAB
25(OH)D3 SERPL-MCNC: 41.9 NG/ML
ALBUMIN SERPL ELPH-MCNC: 4.6 G/DL
ALP BLD-CCNC: 91 U/L
ALT SERPL-CCNC: 25 U/L
ANION GAP SERPL CALC-SCNC: 16 MMOL/L
AST SERPL-CCNC: 33 U/L
BILIRUB SERPL-MCNC: 0.3 MG/DL
BUN SERPL-MCNC: 17 MG/DL
CALCIUM SERPL-MCNC: 9.9 MG/DL
CHLORIDE SERPL-SCNC: 103 MMOL/L
CHOLEST SERPL-MCNC: 232 MG/DL
CO2 SERPL-SCNC: 22 MMOL/L
CREAT SERPL-MCNC: 0.66 MG/DL
EGFRCR SERPLBLD CKD-EPI 2021: 95 ML/MIN/1.73M2
ESTIMATED AVERAGE GLUCOSE: 134 MG/DL
GLUCOSE SERPL-MCNC: 130 MG/DL
HBA1C MFR BLD HPLC: 6.3 %
HCT VFR BLD CALC: 43.7 %
HDLC SERPL-MCNC: 99 MG/DL
HGB BLD-MCNC: 14.3 G/DL
LDLC SERPL-MCNC: 119 MG/DL
MCHC RBC-ENTMCNC: 29.5 PG
MCHC RBC-ENTMCNC: 32.7 G/DL
MCV RBC AUTO: 90.3 FL
NONHDLC SERPL-MCNC: 133 MG/DL
PLATELET # BLD AUTO: 301 K/UL
POTASSIUM SERPL-SCNC: 4.7 MMOL/L
PROT SERPL-MCNC: 7.7 G/DL
RBC # BLD: 4.84 M/UL
RBC # FLD: 14.2 %
SODIUM SERPL-SCNC: 141 MMOL/L
TRIGL SERPL-MCNC: 82 MG/DL
TSH SERPL-ACNC: 1.35 UIU/ML
VIT B12 SERPL-MCNC: 591 PG/ML
WBC # FLD AUTO: 5.78 K/UL

## 2025-09-12 RX ORDER — ROSUVASTATIN CALCIUM 5 MG/1
5 TABLET, FILM COATED ORAL
Qty: 90 | Refills: 0 | Status: ACTIVE | COMMUNITY
Start: 2025-09-12 | End: 1900-01-01

## 2025-09-13 ENCOUNTER — TRANSCRIPTION ENCOUNTER (OUTPATIENT)
Age: 70
End: 2025-09-13

## (undated) DEVICE — DRSG CURITY GAUZE SPONGE 4 X 4" 12-PLY

## (undated) DEVICE — GOWN TRIMAX LG

## (undated) DEVICE — FILTERLINE NASAL O2 CO2 ADLT

## (undated) DEVICE — SOL INJ NS 0.9% 250ML

## (undated) DEVICE — SYR LUER LOK 50CC

## (undated) DEVICE — SUCTION CATH ARGYLE WHISTLE TIP 14FR STRAIGHT PACKED

## (undated) DEVICE — VALVE BIOPSY BRONCHOVIDEOSCOPE

## (undated) DEVICE — DRAIN PLEUROVAC

## (undated) DEVICE — Device

## (undated) DEVICE — NDL HYPO SAFE 18G X 1.5" (PINK)

## (undated) DEVICE — MASK O2 NON REBREATH 3IN1 ADULT

## (undated) DEVICE — VALVE SUCTION EVIS 160/200/240

## (undated) DEVICE — ELCTR GROUNDING PAD ADULT COVIDIEN

## (undated) DEVICE — TUBING SUCTION CONN 6FT STERILE

## (undated) DEVICE — SYR LUER LOK 3CC

## (undated) DEVICE — SYR LUER LOK 10CC

## (undated) DEVICE — BRUSH CYTO ENDO

## (undated) DEVICE — BALLOON SINGLE FOR BF-UC160F

## (undated) DEVICE — SOL INJ NS 0.9% 500ML 1-PORT

## (undated) DEVICE — FILTERLINE ET TUBE PED/ADLT ETCO2

## (undated) DEVICE — TUBING SUCTION 20FT

## (undated) DEVICE — FORCEP RADIAL JAW 4 PEDIATRIC W NDL 1.8MM 2MM 160CM DISP

## (undated) DEVICE — FOLEY HOLDER STATLOCK 2 WAY ADULT

## (undated) DEVICE — NDL ASPIRATION 21G

## (undated) DEVICE — TRAP SPECIMEN SPUTUM 40CC

## (undated) DEVICE — SUCTION YANKAUER NO CONTROL VENT

## (undated) DEVICE — NDL ASPIRATION 22G W SYR